# Patient Record
Sex: MALE | Race: BLACK OR AFRICAN AMERICAN | NOT HISPANIC OR LATINO | Employment: OTHER | URBAN - METROPOLITAN AREA
[De-identification: names, ages, dates, MRNs, and addresses within clinical notes are randomized per-mention and may not be internally consistent; named-entity substitution may affect disease eponyms.]

---

## 2017-03-14 ENCOUNTER — HISTORICAL (OUTPATIENT)
Dept: LAB | Facility: HOSPITAL | Age: 62
End: 2017-03-14

## 2017-03-17 ENCOUNTER — HISTORICAL (OUTPATIENT)
Dept: LAB | Facility: HOSPITAL | Age: 62
End: 2017-03-17

## 2017-09-08 ENCOUNTER — HISTORICAL (OUTPATIENT)
Dept: LAB | Facility: HOSPITAL | Age: 62
End: 2017-09-08

## 2017-09-08 LAB
ALBUMIN SERPL-MCNC: 4 GM/DL (ref 3.4–5)
ALP SERPL-CCNC: 74 UNIT/L (ref 46–116)
ALT SERPL-CCNC: 58 UNIT/L (ref 12–78)
AST SERPL-CCNC: 31 UNIT/L (ref 15–37)
BILIRUB SERPL-MCNC: 0.6 MG/DL (ref 0.2–1)
BILIRUBIN DIRECT+TOT PNL SERPL-MCNC: 0.21 MG/DL (ref 0–0.2)
BILIRUBIN DIRECT+TOT PNL SERPL-MCNC: 0.39 MG/DL (ref 0–0.8)
CHOLEST SERPL-MCNC: 125 MG/DL (ref 0–200)
CHOLEST/HDLC SERPL: 3.5 {RATIO} (ref 0–5)
HDLC SERPL-MCNC: 36 MG/DL (ref 40–60)
LDLC SERPL CALC-MCNC: 39 MG/DL (ref 0–129)
PROT SERPL-MCNC: 7.3 GM/DL (ref 6.4–8.2)
TRIGL SERPL-MCNC: 248 MG/DL
VLDLC SERPL CALC-MCNC: 50 MG/DL

## 2017-10-16 ENCOUNTER — HISTORICAL (OUTPATIENT)
Dept: LAB | Facility: HOSPITAL | Age: 62
End: 2017-10-16

## 2017-10-16 LAB
ABS NEUT (OLG): 4.12 X10(3)/MCL (ref 2.1–9.2)
APTT PPP: 31.2 SECOND(S) (ref 24.8–36.9)
BASOPHILS # BLD AUTO: 0 X10(3)/MCL (ref 0–0.2)
BASOPHILS NFR BLD AUTO: 1 %
BUN SERPL-MCNC: 17 MG/DL (ref 7–18)
CALCIUM SERPL-MCNC: 9.1 MG/DL (ref 8.5–10.1)
CHLORIDE SERPL-SCNC: 106 MMOL/L (ref 98–107)
CO2 SERPL-SCNC: 29 MMOL/L (ref 21–32)
CREAT SERPL-MCNC: 1.03 MG/DL (ref 0.7–1.3)
EOSINOPHIL # BLD AUTO: 0.2 X10(3)/MCL (ref 0–0.9)
EOSINOPHIL NFR BLD AUTO: 3 %
ERYTHROCYTE [DISTWIDTH] IN BLOOD BY AUTOMATED COUNT: 13.4 % (ref 11.5–17)
GLUCOSE SERPL-MCNC: 118 MG/DL (ref 74–106)
HCT VFR BLD AUTO: 43.8 % (ref 42–52)
HGB BLD-MCNC: 14.8 GM/DL (ref 14–18)
INR PPP: 1.03 (ref 0–1.27)
LYMPHOCYTES # BLD AUTO: 1 X10(3)/MCL (ref 0.6–4.6)
LYMPHOCYTES NFR BLD AUTO: 18 %
MCH RBC QN AUTO: 30.1 PG (ref 27–31)
MCHC RBC AUTO-ENTMCNC: 33.8 GM/DL (ref 33–36)
MCV RBC AUTO: 89.2 FL (ref 80–94)
MONOCYTES # BLD AUTO: 0.4 X10(3)/MCL (ref 0.1–1.3)
MONOCYTES NFR BLD AUTO: 8 %
NEUTROPHILS # BLD AUTO: 4.12 X10(3)/MCL (ref 1.4–7.9)
NEUTROPHILS NFR BLD AUTO: 71 %
PLATELET # BLD AUTO: 218 X10(3)/MCL (ref 130–400)
PMV BLD AUTO: 9.2 FL (ref 9.4–12.4)
POTASSIUM SERPL-SCNC: 3.8 MMOL/L (ref 3.5–5.1)
PROTHROMBIN TIME: 13.3 SECOND(S) (ref 12.2–14.7)
RBC # BLD AUTO: 4.91 X10(6)/MCL (ref 4.7–6.1)
SODIUM SERPL-SCNC: 142 MMOL/L (ref 136–145)
WBC # SPEC AUTO: 5.8 X10(3)/MCL (ref 4.5–11.5)

## 2017-10-18 ENCOUNTER — HISTORICAL (OUTPATIENT)
Dept: CARDIOLOGY | Facility: HOSPITAL | Age: 62
End: 2017-10-18

## 2018-01-11 ENCOUNTER — HISTORICAL (OUTPATIENT)
Dept: LAB | Facility: HOSPITAL | Age: 63
End: 2018-01-11

## 2018-01-11 LAB
ALBUMIN SERPL-MCNC: 4.2 GM/DL (ref 3.4–5)
ALBUMIN SERPL-MCNC: 4.2 GM/DL (ref 3.4–5)
ALBUMIN/GLOB SERPL: 1.3 RATIO (ref 1.1–2)
ALP SERPL-CCNC: 65 UNIT/L (ref 46–116)
ALP SERPL-CCNC: 67 UNIT/L (ref 50–136)
ALT SERPL-CCNC: 56 UNIT/L (ref 12–78)
ALT SERPL-CCNC: 57 UNIT/L (ref 12–78)
AST SERPL-CCNC: 21 UNIT/L (ref 15–37)
AST SERPL-CCNC: 23 UNIT/L (ref 15–37)
BILIRUB SERPL-MCNC: 0.6 MG/DL (ref 0.2–1)
BILIRUB SERPL-MCNC: 0.6 MG/DL (ref 0.2–1)
BILIRUBIN DIRECT+TOT PNL SERPL-MCNC: 0.21 MG/DL (ref 0–0.2)
BILIRUBIN DIRECT+TOT PNL SERPL-MCNC: 0.23 MG/DL (ref 0–0.5)
BILIRUBIN DIRECT+TOT PNL SERPL-MCNC: 0.37 MG/DL (ref 0–0.8)
BILIRUBIN DIRECT+TOT PNL SERPL-MCNC: 0.44 MG/DL (ref 0–0.8)
BUN SERPL-MCNC: 17 MG/DL (ref 7–18)
BUN SERPL-MCNC: 17.5 MG/DL (ref 7–18)
CALCIUM SERPL-MCNC: 9.5 MG/DL (ref 8.5–10.1)
CALCIUM SERPL-MCNC: 9.5 MG/DL (ref 8.5–10.1)
CHLORIDE SERPL-SCNC: 102 MMOL/L (ref 98–107)
CHLORIDE SERPL-SCNC: 103 MMOL/L (ref 98–107)
CHOLEST SERPL-MCNC: 142 MG/DL (ref 0–200)
CHOLEST/HDLC SERPL: 3.8 {RATIO} (ref 0–5)
CO2 SERPL-SCNC: 28.1 MMOL/L (ref 21–32)
CO2 SERPL-SCNC: 29.3 MMOL/L (ref 21–32)
CREAT SERPL-MCNC: 0.97 MG/DL (ref 0.6–1.3)
CREAT SERPL-MCNC: 0.97 MG/DL (ref 0.6–1.3)
EST. AVERAGE GLUCOSE BLD GHB EST-MCNC: 140 MG/DL
GLOBULIN SER-MCNC: 3.3 GM/DL (ref 2.4–3.5)
GLUCOSE SERPL-MCNC: 148 MG/DL (ref 74–106)
GLUCOSE SERPL-MCNC: 150 MG/DL (ref 74–106)
HBA1C MFR BLD: 6.5 % (ref 4.5–6.2)
HDLC SERPL-MCNC: 37 MG/DL (ref 40–60)
LDLC SERPL CALC-MCNC: 33 MG/DL (ref 0–129)
LIVER PROFILE INTERP: NORMAL
POTASSIUM SERPL-SCNC: 4.2 MMOL/L (ref 3.5–5.1)
POTASSIUM SERPL-SCNC: 4.4 MMOL/L (ref 3.5–5.1)
PROT SERPL-MCNC: 7.4 GM/DL (ref 6.4–8.2)
PROT SERPL-MCNC: 7.5 GM/DL (ref 6.4–8.2)
SODIUM SERPL-SCNC: 137 MMOL/L (ref 136–145)
SODIUM SERPL-SCNC: 138 MMOL/L (ref 136–145)
TRIGL SERPL-MCNC: 358 MG/DL
TSH SERPL-ACNC: 6.59 MIU/ML (ref 0.36–3.74)
VLDLC SERPL CALC-MCNC: 72 MG/DL

## 2018-05-03 ENCOUNTER — HISTORICAL (OUTPATIENT)
Dept: LAB | Facility: HOSPITAL | Age: 63
End: 2018-05-03

## 2018-05-03 LAB
BUN SERPL-MCNC: 18.3 MG/DL (ref 7–18)
CALCIUM SERPL-MCNC: 8.8 MG/DL (ref 8.5–10.1)
CHLORIDE SERPL-SCNC: 103 MMOL/L (ref 98–107)
CO2 SERPL-SCNC: 29.5 MMOL/L (ref 21–32)
CREAT SERPL-MCNC: 1.05 MG/DL (ref 0.6–1.3)
CREAT/UREA NIT SERPL: 17
EST. AVERAGE GLUCOSE BLD GHB EST-MCNC: 137 MG/DL
GLUCOSE SERPL-MCNC: 146 MG/DL (ref 74–106)
HBA1C MFR BLD: 6.4 % (ref 4.5–6.2)
POTASSIUM SERPL-SCNC: 3.7 MMOL/L (ref 3.5–5.1)
PSA SERPL-MCNC: 0.66 NG/ML (ref 0–4)
SODIUM SERPL-SCNC: 144 MMOL/L (ref 136–145)

## 2018-08-20 ENCOUNTER — HISTORICAL (OUTPATIENT)
Dept: LAB | Facility: HOSPITAL | Age: 63
End: 2018-08-20

## 2018-08-20 LAB
ALBUMIN SERPL-MCNC: 4.2 GM/DL (ref 3.4–5)
ALP SERPL-CCNC: 60 UNIT/L (ref 46–116)
ALT SERPL-CCNC: 48 UNIT/L (ref 12–78)
AST SERPL-CCNC: 29 UNIT/L (ref 15–37)
BILIRUB SERPL-MCNC: 0.7 MG/DL (ref 0.2–1)
BILIRUBIN DIRECT+TOT PNL SERPL-MCNC: 0.23 MG/DL (ref 0–0.2)
BILIRUBIN DIRECT+TOT PNL SERPL-MCNC: 0.47 MG/DL (ref 0–0.8)
CHOLEST SERPL-MCNC: 118 MG/DL (ref 0–200)
CHOLEST/HDLC SERPL: 3.1 {RATIO} (ref 0–5)
HDLC SERPL-MCNC: 38 MG/DL (ref 40–60)
LDLC SERPL CALC-MCNC: 40 MG/DL (ref 0–129)
PROT SERPL-MCNC: 7.3 GM/DL (ref 6.4–8.2)
TRIGL SERPL-MCNC: 200 MG/DL
VLDLC SERPL CALC-MCNC: 40 MG/DL

## 2018-09-19 ENCOUNTER — HISTORICAL (OUTPATIENT)
Dept: LAB | Facility: HOSPITAL | Age: 63
End: 2018-09-19

## 2018-09-19 LAB
BUN SERPL-MCNC: 18.8 MG/DL (ref 7–18)
CALCIUM SERPL-MCNC: 9.3 MG/DL (ref 8.5–10.1)
CHLORIDE SERPL-SCNC: 102 MMOL/L (ref 98–107)
CO2 SERPL-SCNC: 28.4 MMOL/L (ref 21–32)
CREAT SERPL-MCNC: 1.17 MG/DL (ref 0.6–1.3)
CREAT/UREA NIT SERPL: 16
GLUCOSE SERPL-MCNC: 90 MG/DL (ref 74–106)
POTASSIUM SERPL-SCNC: 4.5 MMOL/L (ref 3.5–5.1)
SODIUM SERPL-SCNC: 140 MMOL/L (ref 136–145)
T3FREE SERPL-MCNC: 3.1 PG/ML (ref 2.2–4)
T4 FREE SERPL-MCNC: 0.88 NG/DL (ref 0.76–1.46)
TSH SERPL-ACNC: 3.95 MIU/ML (ref 0.36–3.74)

## 2018-10-26 ENCOUNTER — HISTORICAL (OUTPATIENT)
Dept: RADIOLOGY | Facility: HOSPITAL | Age: 63
End: 2018-10-26

## 2018-10-26 LAB
ABS NEUT (OLG): 4.67 X10(3)/MCL (ref 2.1–9.2)
ALBUMIN SERPL-MCNC: 4.1 GM/DL (ref 3.4–5)
ALBUMIN/GLOB SERPL: 1.4 RATIO (ref 1.1–2)
ALP SERPL-CCNC: 65 UNIT/L (ref 46–116)
ALT SERPL-CCNC: 57 UNIT/L (ref 12–78)
AST SERPL-CCNC: 25 UNIT/L (ref 15–37)
BASOPHILS # BLD AUTO: 0 X10(3)/MCL (ref 0–0.2)
BASOPHILS NFR BLD AUTO: 0 %
BILIRUB SERPL-MCNC: 0.4 MG/DL (ref 0.2–1)
BILIRUBIN DIRECT+TOT PNL SERPL-MCNC: 0.18 MG/DL (ref 0–0.2)
BILIRUBIN DIRECT+TOT PNL SERPL-MCNC: 0.22 MG/DL (ref 0–0.8)
BUN SERPL-MCNC: 15.2 MG/DL (ref 7–18)
CALCIUM SERPL-MCNC: 8.8 MG/DL (ref 8.5–10.1)
CHLORIDE SERPL-SCNC: 105 MMOL/L (ref 98–107)
CHOLEST SERPL-MCNC: 100 MG/DL (ref 0–200)
CHOLEST/HDLC SERPL: 4.2 {RATIO} (ref 0–5)
CO2 SERPL-SCNC: 30 MMOL/L (ref 21–32)
CREAT SERPL-MCNC: 1.11 MG/DL (ref 0.6–1.3)
EOSINOPHIL # BLD AUTO: 0.2 X10(3)/MCL (ref 0–0.9)
EOSINOPHIL NFR BLD AUTO: 3 %
ERYTHROCYTE [DISTWIDTH] IN BLOOD BY AUTOMATED COUNT: 13 % (ref 11.5–17)
EST. AVERAGE GLUCOSE BLD GHB EST-MCNC: 148 MG/DL
GLOBULIN SER-MCNC: 3 GM/DL (ref 2.4–3.5)
GLUCOSE SERPL-MCNC: 110 MG/DL (ref 74–106)
HBA1C MFR BLD: 6.8 % (ref 4.5–6.2)
HCT VFR BLD AUTO: 39.3 % (ref 42–52)
HDLC SERPL-MCNC: 24 MG/DL (ref 40–60)
HGB BLD-MCNC: 12.9 GM/DL (ref 14–18)
IMM GRANULOCYTES # BLD AUTO: 0.02 % (ref 0–0.02)
IMM GRANULOCYTES NFR BLD AUTO: 0.3 % (ref 0–0.43)
LDLC SERPL CALC-MCNC: 46 MG/DL (ref 0–129)
LYMPHOCYTES # BLD AUTO: 1 X10(3)/MCL (ref 0.6–4.6)
LYMPHOCYTES NFR BLD AUTO: 16 %
MCH RBC QN AUTO: 29.3 PG (ref 27–31)
MCHC RBC AUTO-ENTMCNC: 32.8 GM/DL (ref 33–36)
MCV RBC AUTO: 89.3 FL (ref 80–94)
MONOCYTES # BLD AUTO: 0.5 X10(3)/MCL (ref 0.1–1.3)
MONOCYTES NFR BLD AUTO: 8 %
NEUTROPHILS # BLD AUTO: 4.67 X10(3)/MCL (ref 1.4–7.9)
NEUTROPHILS NFR BLD AUTO: 73 %
PLATELET # BLD AUTO: 259 X10(3)/MCL (ref 130–400)
PMV BLD AUTO: 8.6 FL (ref 9.4–12.4)
POTASSIUM SERPL-SCNC: 4.8 MMOL/L (ref 3.5–5.1)
PROT SERPL-MCNC: 7.1 GM/DL (ref 6.4–8.2)
RBC # BLD AUTO: 4.4 X10(6)/MCL (ref 4.7–6.1)
SODIUM SERPL-SCNC: 143 MMOL/L (ref 136–145)
TRIGL SERPL-MCNC: 151 MG/DL
TSH SERPL-ACNC: 6.5 MIU/ML (ref 0.36–3.74)
VLDLC SERPL CALC-MCNC: 30 MG/DL
WBC # SPEC AUTO: 6.4 X10(3)/MCL (ref 4.5–11.5)

## 2019-01-31 ENCOUNTER — HISTORICAL (OUTPATIENT)
Dept: LAB | Facility: HOSPITAL | Age: 64
End: 2019-01-31

## 2019-01-31 LAB
ABS NEUT (OLG): 2.83 X10(3)/MCL (ref 2.1–9.2)
ALBUMIN SERPL-MCNC: 4.4 GM/DL (ref 3.4–5)
ALBUMIN/GLOB SERPL: 1.4 RATIO (ref 1.1–2)
ALP SERPL-CCNC: 80 UNIT/L (ref 46–116)
ALT SERPL-CCNC: 42 UNIT/L (ref 12–78)
AST SERPL-CCNC: 20 UNIT/L (ref 15–37)
BASOPHILS # BLD AUTO: 0 X10(3)/MCL (ref 0–0.2)
BASOPHILS NFR BLD AUTO: 0 %
BILIRUB SERPL-MCNC: 0.9 MG/DL (ref 0.2–1)
BILIRUBIN DIRECT+TOT PNL SERPL-MCNC: 0.23 MG/DL (ref 0–0.2)
BILIRUBIN DIRECT+TOT PNL SERPL-MCNC: 0.67 MG/DL (ref 0–0.8)
BUN SERPL-MCNC: 18.8 MG/DL (ref 7–18)
CALCIUM SERPL-MCNC: 9.5 MG/DL (ref 8.5–10.1)
CHLORIDE SERPL-SCNC: 105 MMOL/L (ref 98–107)
CHOLEST SERPL-MCNC: 159 MG/DL (ref 0–200)
CHOLEST/HDLC SERPL: 4.4 {RATIO} (ref 0–5)
CO2 SERPL-SCNC: 29.4 MMOL/L (ref 21–32)
CREAT SERPL-MCNC: 0.97 MG/DL (ref 0.6–1.3)
EOSINOPHIL # BLD AUTO: 0.2 X10(3)/MCL (ref 0–0.9)
EOSINOPHIL NFR BLD AUTO: 5 %
ERYTHROCYTE [DISTWIDTH] IN BLOOD BY AUTOMATED COUNT: 12.9 % (ref 11.5–17)
EST. AVERAGE GLUCOSE BLD GHB EST-MCNC: 134 MG/DL
GLOBULIN SER-MCNC: 3.2 GM/DL (ref 2.4–3.5)
GLUCOSE SERPL-MCNC: 126 MG/DL (ref 74–106)
HBA1C MFR BLD: 6.3 % (ref 4.5–6.2)
HCT VFR BLD AUTO: 47.1 % (ref 42–52)
HDLC SERPL-MCNC: 36 MG/DL (ref 40–60)
HGB BLD-MCNC: 15.3 GM/DL (ref 14–18)
LDLC SERPL CALC-MCNC: 72 MG/DL (ref 0–129)
LYMPHOCYTES # BLD AUTO: 1.2 X10(3)/MCL (ref 0.6–4.6)
LYMPHOCYTES NFR BLD AUTO: 25 %
MCH RBC QN AUTO: 29.3 PG (ref 27–31)
MCHC RBC AUTO-ENTMCNC: 32.5 GM/DL (ref 33–36)
MCV RBC AUTO: 90.1 FL (ref 80–94)
MONOCYTES # BLD AUTO: 0.4 X10(3)/MCL (ref 0.1–1.3)
MONOCYTES NFR BLD AUTO: 8 %
NEUTROPHILS # BLD AUTO: 2.83 X10(3)/MCL (ref 1.4–7.9)
NEUTROPHILS NFR BLD AUTO: 61 %
PLATELET # BLD AUTO: 228 X10(3)/MCL (ref 130–400)
PMV BLD AUTO: 9.1 FL (ref 9.4–12.4)
POTASSIUM SERPL-SCNC: 4 MMOL/L (ref 3.5–5.1)
PROT SERPL-MCNC: 7.6 GM/DL (ref 6.4–8.2)
PSA SERPL-MCNC: 0.55 NG/ML (ref 0–4)
RBC # BLD AUTO: 5.23 X10(6)/MCL (ref 4.7–6.1)
SODIUM SERPL-SCNC: 142 MMOL/L (ref 136–145)
T4 FREE SERPL-MCNC: 0.8 NG/DL (ref 0.76–1.46)
TRIGL SERPL-MCNC: 256 MG/DL
TSH SERPL-ACNC: 3.65 MIU/ML (ref 0.36–3.74)
VLDLC SERPL CALC-MCNC: 51 MG/DL
WBC # SPEC AUTO: 4.6 X10(3)/MCL (ref 4.5–11.5)

## 2019-05-08 ENCOUNTER — HISTORICAL (OUTPATIENT)
Dept: LAB | Facility: HOSPITAL | Age: 64
End: 2019-05-08

## 2019-05-08 LAB
ALBUMIN SERPL-MCNC: 4.2 GM/DL (ref 3.4–5)
ALP SERPL-CCNC: 74 UNIT/L (ref 46–116)
ALT SERPL-CCNC: 52 UNIT/L (ref 12–78)
AST SERPL-CCNC: 30 UNIT/L (ref 15–37)
BILIRUB SERPL-MCNC: 0.6 MG/DL (ref 0.2–1)
BILIRUBIN DIRECT+TOT PNL SERPL-MCNC: 0.25 MG/DL (ref 0–0.2)
BILIRUBIN DIRECT+TOT PNL SERPL-MCNC: 0.35 MG/DL (ref 0–0.8)
CHOLEST SERPL-MCNC: 138 MG/DL (ref 0–200)
CHOLEST/HDLC SERPL: 3.8 {RATIO} (ref 0–5)
HDLC SERPL-MCNC: 36 MG/DL (ref 40–60)
LDLC SERPL CALC-MCNC: 52 MG/DL (ref 0–129)
PROT SERPL-MCNC: 7.6 GM/DL (ref 6.4–8.2)
TRIGL SERPL-MCNC: 250 MG/DL
VLDLC SERPL CALC-MCNC: 50 MG/DL

## 2019-09-03 ENCOUNTER — HISTORICAL (OUTPATIENT)
Dept: LAB | Facility: HOSPITAL | Age: 64
End: 2019-09-03

## 2019-09-03 LAB
ALBUMIN SERPL-MCNC: 4.3 GM/DL (ref 3.4–5)
ALP SERPL-CCNC: 74 UNIT/L (ref 50–136)
ALT SERPL-CCNC: 52 UNIT/L (ref 12–78)
AST SERPL-CCNC: 26 UNIT/L (ref 15–37)
BILIRUB SERPL-MCNC: 0.7 MG/DL (ref 0.2–1)
BILIRUBIN DIRECT+TOT PNL SERPL-MCNC: 0.2 MG/DL (ref 0–0.5)
BILIRUBIN DIRECT+TOT PNL SERPL-MCNC: 0.5 MG/DL (ref 0–0.8)
BUN SERPL-MCNC: 21 MG/DL (ref 7–18)
CALCIUM SERPL-MCNC: 9.2 MG/DL (ref 8.5–10.1)
CHLORIDE SERPL-SCNC: 105 MMOL/L (ref 98–107)
CHOLEST SERPL-MCNC: 108 MG/DL (ref 0–200)
CHOLEST/HDLC SERPL: 3.6 {RATIO} (ref 0–5)
CO2 SERPL-SCNC: 29 MMOL/L (ref 21–32)
CREAT SERPL-MCNC: 1.06 MG/DL (ref 0.7–1.3)
CREAT/UREA NIT SERPL: 19.8
GLUCOSE SERPL-MCNC: 135 MG/DL (ref 74–106)
HDLC SERPL-MCNC: 30 MG/DL (ref 35–60)
LDLC SERPL CALC-MCNC: 22 MG/DL (ref 0–129)
POTASSIUM SERPL-SCNC: 4.5 MMOL/L (ref 3.5–5.1)
PROT SERPL-MCNC: 7.1 GM/DL (ref 6.4–8.2)
SODIUM SERPL-SCNC: 141 MMOL/L (ref 136–145)
T3FREE SERPL-MCNC: 2.93 PG/ML (ref 2.18–3.98)
T4 FREE SERPL-MCNC: 0.89 NG/DL (ref 0.76–1.46)
TRIGL SERPL-MCNC: 278 MG/DL (ref 30–150)
TSH SERPL-ACNC: 4.47 MIU/L (ref 0.36–3.74)
VLDLC SERPL CALC-MCNC: 56 MG/DL

## 2019-11-20 ENCOUNTER — HOSPITAL ENCOUNTER (OUTPATIENT)
Dept: MEDSURG UNIT | Facility: HOSPITAL | Age: 64
End: 2019-11-21
Attending: HOSPITALIST | Admitting: HOSPITALIST

## 2019-11-20 LAB
POC TROPONIN: 0.01 NG/ML (ref 0–0.08)
POC TROPONIN: 0.02 NG/ML (ref 0–0.08)

## 2019-11-21 LAB — POC TROPONIN: 0.01 NG/ML (ref 0–0.08)

## 2020-09-10 ENCOUNTER — HISTORICAL (OUTPATIENT)
Dept: LAB | Facility: HOSPITAL | Age: 65
End: 2020-09-10

## 2020-09-10 LAB
ALBUMIN SERPL-MCNC: 4.6 GM/DL (ref 3.4–5)
ALBUMIN/GLOB SERPL: 1.2 RATIO (ref 1.1–2)
ALP SERPL-CCNC: 86 UNIT/L (ref 46–116)
ALT SERPL-CCNC: 40 UNIT/L (ref 12–78)
AST SERPL-CCNC: 16 UNIT/L (ref 15–37)
BILIRUB SERPL-MCNC: 0.8 MG/DL (ref 0.2–1)
BILIRUBIN DIRECT+TOT PNL SERPL-MCNC: 0.3 MG/DL (ref 0–0.2)
BILIRUBIN DIRECT+TOT PNL SERPL-MCNC: 0.5 MG/DL (ref 0–0.8)
BUN SERPL-MCNC: 22.7 MG/DL (ref 7–18)
CALCIUM SERPL-MCNC: 10.2 MG/DL (ref 8.5–10.1)
CHLORIDE SERPL-SCNC: 98 MMOL/L (ref 98–107)
CO2 SERPL-SCNC: 32.4 MMOL/L (ref 21–32)
CREAT SERPL-MCNC: 1.38 MG/DL (ref 0.6–1.3)
EST. AVERAGE GLUCOSE BLD GHB EST-MCNC: 235 MG/DL
GLOBULIN SER-MCNC: 3.8 GM/DL (ref 2.4–3.5)
GLUCOSE SERPL-MCNC: 429 MG/DL (ref 74–106)
HBA1C MFR BLD: 9.8 % (ref 4.5–6.2)
POTASSIUM SERPL-SCNC: 4.8 MMOL/L (ref 3.5–5.1)
PROT SERPL-MCNC: 8.4 GM/DL (ref 6.4–8.2)
SODIUM SERPL-SCNC: 136 MMOL/L (ref 136–145)

## 2020-12-01 ENCOUNTER — HISTORICAL (OUTPATIENT)
Dept: LAB | Facility: HOSPITAL | Age: 65
End: 2020-12-01

## 2020-12-01 LAB
ALBUMIN SERPL-MCNC: 4.2 GM/DL (ref 3.4–4.8)
ALP SERPL-CCNC: 53 UNIT/L (ref 40–150)
ALT SERPL-CCNC: 35 UNIT/L (ref 0–55)
AST SERPL-CCNC: 26 UNIT/L (ref 5–34)
BILIRUB SERPL-MCNC: 1 MG/DL
BILIRUBIN DIRECT+TOT PNL SERPL-MCNC: 0.4 MG/DL (ref 0–0.5)
BILIRUBIN DIRECT+TOT PNL SERPL-MCNC: 0.6 MG/DL (ref 0–0.8)
CHOLEST SERPL-MCNC: 118 MG/DL
CHOLEST/HDLC SERPL: 3 {RATIO} (ref 0–5)
HDLC SERPL-MCNC: 43 MG/DL (ref 35–60)
LDLC SERPL CALC-MCNC: 48 MG/DL (ref 50–140)
PROT SERPL-MCNC: 7.3 GM/DL (ref 5.8–7.6)
TRIGL SERPL-MCNC: 136 MG/DL (ref 34–140)
VLDLC SERPL CALC-MCNC: 27 MG/DL

## 2021-06-22 ENCOUNTER — HISTORICAL (OUTPATIENT)
Dept: LAB | Facility: HOSPITAL | Age: 66
End: 2021-06-22

## 2021-06-22 LAB
ALBUMIN SERPL-MCNC: 4.2 GM/DL (ref 3.4–4.8)
ALP SERPL-CCNC: 60 UNIT/L (ref 40–150)
ALT SERPL-CCNC: 43 UNIT/L (ref 0–55)
AST SERPL-CCNC: 26 UNIT/L (ref 5–34)
BILIRUB SERPL-MCNC: 0.9 MG/DL
BILIRUBIN DIRECT+TOT PNL SERPL-MCNC: 0.4 MG/DL (ref 0–0.5)
BILIRUBIN DIRECT+TOT PNL SERPL-MCNC: 0.5 MG/DL (ref 0–0.8)
CHOLEST SERPL-MCNC: 107 MG/DL
CHOLEST/HDLC SERPL: 3 {RATIO} (ref 0–5)
HDLC SERPL-MCNC: 34 MG/DL (ref 35–60)
LDLC SERPL CALC-MCNC: 20 MG/DL (ref 50–140)
PROT SERPL-MCNC: 7 GM/DL (ref 5.8–7.6)
TRIGL SERPL-MCNC: 266 MG/DL (ref 34–140)
VLDLC SERPL CALC-MCNC: 53 MG/DL

## 2021-07-19 ENCOUNTER — HISTORICAL (OUTPATIENT)
Dept: LAB | Facility: HOSPITAL | Age: 66
End: 2021-07-19

## 2021-07-19 LAB
HAV IGM SERPL QL IA: NONREACTIVE
HBV CORE IGM SERPL QL IA: NONREACTIVE
HBV SURFACE AG SERPL QL IA: NONREACTIVE
HCV AB SERPL QL IA: REACTIVE
HEPATITIS PANEL INTERP: ABNORMAL

## 2021-08-24 ENCOUNTER — HISTORICAL (OUTPATIENT)
Dept: ADMINISTRATIVE | Facility: HOSPITAL | Age: 66
End: 2021-08-24

## 2021-08-24 LAB
ABS NEUT (OLG): 3.74 X10(3)/MCL (ref 2.1–9.2)
ALBUMIN SERPL-MCNC: 4.6 GM/DL (ref 3.4–4.8)
ALBUMIN/GLOB SERPL: 1.5 RATIO (ref 1.1–2)
ALP SERPL-CCNC: 50 UNIT/L (ref 40–150)
ALT SERPL-CCNC: 28 UNIT/L (ref 0–55)
AST SERPL-CCNC: 20 UNIT/L (ref 5–34)
BASOPHILS # BLD AUTO: 0 X10(3)/MCL (ref 0–0.2)
BASOPHILS NFR BLD AUTO: 1 %
BILIRUB SERPL-MCNC: 0.7 MG/DL
BILIRUBIN DIRECT+TOT PNL SERPL-MCNC: 0.3 MG/DL (ref 0–0.5)
BILIRUBIN DIRECT+TOT PNL SERPL-MCNC: 0.4 MG/DL (ref 0–0.8)
BUN SERPL-MCNC: 14.9 MG/DL (ref 8.4–25.7)
CALCIUM SERPL-MCNC: 10.4 MG/DL (ref 8.8–10)
CHLORIDE SERPL-SCNC: 109 MMOL/L (ref 98–107)
CO2 SERPL-SCNC: 21 MMOL/L (ref 23–31)
CREAT SERPL-MCNC: 0.81 MG/DL (ref 0.73–1.18)
EOSINOPHIL # BLD AUTO: 0.3 X10(3)/MCL (ref 0–0.9)
EOSINOPHIL NFR BLD AUTO: 6 %
ERYTHROCYTE [DISTWIDTH] IN BLOOD BY AUTOMATED COUNT: 13.2 % (ref 11.5–14.5)
FERRITIN SERPL-MCNC: 610.79 NG/ML (ref 21.81–274.66)
GLOBULIN SER-MCNC: 3.1 GM/DL (ref 2.4–3.5)
GLUCOSE SERPL-MCNC: 106 MG/DL (ref 82–115)
HAV AB SER QL IA: NONREACTIVE
HBV CORE AB SERPL QL IA: NONREACTIVE
HBV SURFACE AB SER-ACNC: 0.59 MIU/ML
HBV SURFACE AB SERPL IA-ACNC: NONREACTIVE M[IU]/ML
HBV SURFACE AG SERPL QL IA: NONREACTIVE
HCT VFR BLD AUTO: 38.5 % (ref 40–51)
HGB BLD-MCNC: 12.8 GM/DL (ref 13.5–17.5)
HIV 1+2 AB+HIV1 P24 AG SERPL QL IA: NONREACTIVE
IMM GRANULOCYTES # BLD AUTO: 0.01 10*3/UL
IMM GRANULOCYTES NFR BLD AUTO: 0 %
INR PPP: 1.06 (ref 0.9–1.2)
LYMPHOCYTES # BLD AUTO: 1.1 X10(3)/MCL (ref 0.6–4.6)
LYMPHOCYTES NFR BLD AUTO: 19 %
MCH RBC QN AUTO: 31 PG (ref 26–34)
MCHC RBC AUTO-ENTMCNC: 33.2 GM/DL (ref 31–37)
MCV RBC AUTO: 93.2 FL (ref 80–100)
MONOCYTES # BLD AUTO: 0.4 X10(3)/MCL (ref 0.1–1.3)
MONOCYTES NFR BLD AUTO: 7 %
NEUTROPHILS # BLD AUTO: 3.74 X10(3)/MCL (ref 2.1–9.2)
NEUTROPHILS NFR BLD AUTO: 67 %
NRBC BLD AUTO-RTO: 0 % (ref 0–0.2)
PLATELET # BLD AUTO: 254 X10(3)/MCL (ref 130–400)
PMV BLD AUTO: 9.5 FL (ref 7.4–10.4)
POTASSIUM SERPL-SCNC: 4.6 MMOL/L (ref 3.5–5.1)
PROT SERPL-MCNC: 7.7 GM/DL (ref 5.8–7.6)
PROTHROMBIN TIME: 13.6 SECOND(S) (ref 11.9–14.4)
RBC # BLD AUTO: 4.13 X10(6)/MCL (ref 4.5–5.9)
SODIUM SERPL-SCNC: 140 MMOL/L (ref 136–145)
T PALLIDUM AB SER QL: NONREACTIVE
WBC # SPEC AUTO: 5.6 X10(3)/MCL (ref 4.5–11)

## 2021-09-01 ENCOUNTER — HISTORICAL (OUTPATIENT)
Dept: LAB | Facility: HOSPITAL | Age: 66
End: 2021-09-01

## 2021-09-01 LAB — SARS-COV-2 RNA RESP QL NAA+PROBE: POSITIVE

## 2022-03-07 ENCOUNTER — HISTORICAL (OUTPATIENT)
Dept: LAB | Facility: HOSPITAL | Age: 67
End: 2022-03-07

## 2022-03-07 LAB
ALBUMIN SERPL-MCNC: 4.2 G/DL (ref 3.4–4.8)
ALP SERPL-CCNC: 52 U/L (ref 40–150)
ALT SERPL-CCNC: 23 U/L (ref 0–55)
AST SERPL-CCNC: 17 U/L (ref 5–34)
BILIRUB SERPL-MCNC: 0.6 MG/DL
BILIRUBIN DIRECT+TOT PNL SERPL-MCNC: 0.3 (ref 0–0.5)
BILIRUBIN DIRECT+TOT PNL SERPL-MCNC: 0.3 (ref 0–0.8)
CHOLEST SERPL-MCNC: 131 MG/DL
CHOLEST/HDLC SERPL: 4 {RATIO} (ref 0–5)
HDLC SERPL-MCNC: 36 MG/DL (ref 35–60)
HEMOLYSIS INTERF INDEX SERPL-ACNC: 7
ICTERIC INTERF INDEX SERPL-ACNC: 0
LDLC SERPL CALC-MCNC: 59 MG/DL (ref 50–140)
LIPEMIC INTERF INDEX SERPL-ACNC: 6
PROT SERPL-MCNC: 7.2 G/DL (ref 5.8–7.6)
TRIGL SERPL-MCNC: 182 MG/DL (ref 34–140)
VLDLC SERPL CALC-MCNC: 36 MG/DL

## 2022-04-29 ENCOUNTER — HOSPITAL ENCOUNTER (OUTPATIENT)
Dept: MEDSURG UNIT | Facility: HOSPITAL | Age: 67
End: 2022-04-29
Attending: INTERNAL MEDICINE | Admitting: INTERNAL MEDICINE

## 2022-04-29 LAB
BUN SERPL-MCNC: 16.8 MG/DL (ref 8.4–25.7)
CALCIUM SERPL-MCNC: 10 MG/DL (ref 8.7–10.5)
CBG: 88 (ref 70–115)
CHLORIDE SERPL-SCNC: 101 MMOL/L (ref 98–107)
CO2 SERPL-SCNC: 28 MMOL/L (ref 23–31)
CREAT SERPL-MCNC: 0.79 MG/DL (ref 0.73–1.18)
CREAT/UREA NIT SERPL: 21
GLUCOSE SERPL-MCNC: 91 MG/DL (ref 82–115)
HEMOLYSIS INTERF INDEX SERPL-ACNC: 2
HEMOLYSIS INTERF INDEX SERPL-ACNC: 2
ICTERIC INTERF INDEX SERPL-ACNC: 1
LIPEMIC INTERF INDEX SERPL-ACNC: 8
MAGNESIUM SERPL-MCNC: 1.6 MG/DL (ref 1.6–2.6)
POTASSIUM SERPL-SCNC: 3.3 MMOL/L (ref 3.5–5.1)
SODIUM SERPL-SCNC: 141 MMOL/L (ref 136–145)

## 2022-04-30 NOTE — H&P
"   Patient:   Minesh Dumont            MRN: [id number]            FIN: [id number]               Age:   64 years     Sex:  Male     :  1955   Associated Diagnoses:   None   Author:   Nurys Burt MD      Chief Complaint   2019 6:23 CST      Pt with complaints of feeling dizzy and just not 'feeling well". Also complaints of slight shortness of breath         HPI: This is a 64-year-old obese -American male who has significant history of ischemic heart disease, patient with LVEF 40%, CABG, PCI, aortic sclerosis, diabetic and CKD stage III presented to the hospital with 2 to 3 days of worsening short of breath with exertion and sensation of not feeling well, his blood pressure was elevated on arrival with systolic in the 200s, patient received Lasix in the emergency room with improvement of symptoms, the time of this evaluation he is not on any distress, no using any oxygen, BN P was elevated 1085, chest x-ray with increased interstitial markings consistent with either pulmonary edema versus pneumonitis, he denies any cough or other respiratory symptoms, patient has been treated for a tooth infection with Augmentin.  Context: CAD, HTN, patient also with a sleep apnea habitus never had a sleep study is done.  Modifying factors: Improved with Lasix.  Accompanying signs and symptoms: Complaint toothache, denies fever, mild nonproductive cough, no chills, no flulike symptoms.    ROS: All system reviewed found to be negative except what is mentioned above.      Health Status   Allergies:    Allergic Reactions (All)  Severity Not Documented  ACE inhibitors- Dry cough.  Canceled/Inactive Reactions (All)  No Known Allergies,    Allergies (1) Active Reaction  ACE inhibitors Dry cough     Current medications:  (Selected)   Inpatient Medications  Ordered  Lasix: 40 mg, form: Injection, IV Push, Once, first dose 19 16:00:00 CST, stop date 19 16:00:00 CST  Pantoprazole 40 mg ORAL " EC-Tablet: 40 mg, form: Tab-EC, Oral, Daily, first dose 19 6:00:00 CST  Plavix 75 mg oral tablet: 75 mg, form: Tab, Oral, Daily, first dose 19 9:00:00 CST  Ranexa: 500 mg, form: Tab-ER, Oral, BID, first dose 19 21:00:00 CST  Xarelto 2.5mg tab: 1 dose, form: Dose, Oral, BID, first dose 19 21:00:00 CST  allopurinol: 300 mg, form: Tab, Oral, Daily, first dose 19 9:00:00 CST  aspirin 81 mg oral tablet, CHEWABLE: 81 mg, form: Tab-Chew, Oral, Daily, first dose 19 9:00:00 CST  atorvastatin: 80 mg, form: Tab, Oral, Daily, first dose 19 17:00:00 CST  carvedilol 12.5 mg oral tablet: 25 mg, form: Tab, Oral, BID, first dose 19 21:00:00 CST  isosorbide MONOnitrate 60 mg oral tablet, Extended Release: 120 mg, form: Tab-ER, Oral, Daily, first dose 19 12:33:00 CST  losartan: 100 mg, form: Tab, Oral, Daily, first dose 19 9:00:00 CST  losartan: 50 mg, form: Tab, Oral, Once, first dose 19 13:00:00 CST, stop date 19 13:00:00 CST  Prescriptions  Prescribed  Zofran ODT 8 mg oral tablet, disintegratin mg = 1 tab(s), Oral, q8hr, # 10 tab(s), 0 Refill(s), Pharmacy: [facility]  amoxicillin/clarithromycin/omeprazole 500 mg-500 mg-20 mg oral kit: See Instructions, Take as directed., # 1 packet(s), 0 Refill(s), Pharmacy: [facility]  baclofen 10 mg oral tablet: 10 mg = 1 tab(s), Oral, TID, PRN PRN as needed for muscle spasm, # 21 tab(s), 0 Refill(s), Pharmacy: [facility]  tiZANidine 2 mg oral tablet: 2 mg = 1 tab(s), Oral, q8hr, PRN PRN as needed for muscle spasm, # 30 tab(s), 0 Refill(s), Pharmacy: [facility]  Documented Medications  Documented  1280 mg Omega-3 oral capsule: 1280 mg Omega-3 oral capsule, 1 cap(s), Oral, BID  AMOXICILLIN  CAP 500M mg = 1 cap(s), Oral, BID  CLARITHROMYC TAB 500M mg = 1 tab(s), Oral, BID  LATANOPROST  SOL 0.005%:   LOSARTAN POT TAB 100M mg = 1 tab(s), Oral,  Daily  METFORMIN    TAB 500MG ER: 500 mg = 1 tab(s), Oral, BID  OMEPRAZOLE   CAP 20M mg = 1 cap(s), Oral, BID  Pantoprazole 40 mg ORAL EC-Tablet: 40 mg = 1 tab(s), Oral, Daily  Plavix 75 mg oral tablet: 75 mg = 1 tab(s), Oral, Daily  Ranexa 500 mg oral tablet, extended release: 500 mg = 1 tab(s), Oral, BID  Vascepa 1 g oral capsule: 2 gm = 2 cap(s), Oral, BID  Xarelto 2.5 mg oral tablet: 2.5 mg = 1 tab(s), Oral, BID  acetaminophen-codeine 300 mg-30 mg oral tablet.: 1 tab(s), Oral, q4-6hr  allopurinol 300 mg oral tablet: 300 mg = 1 tab(s), Oral, Daily, 0 Refill(s)  amoxicillin 500 mg oral tablet: 500 mg = 1 tab(s), Oral, QID  aspirin 81 mg oral tablet, CHEWABLE: 81 mg = 1 tab(s), Oral, Daily, 0 Refill(s)  atorvastatin 80 mg oral tablet: 80 mg = 1 tab(s), Oral, Daily, 0 Refill(s)  carvedilol 12.5 mg oral tablet: 12.5 mg = 1 tab(s), Oral, BID  carvedilol 25 mg oral tablet: 25 mg = 1 tab(s), Oral, BID  ergocalciferol 50,000 intl units (1.25 mg) oral capsule: 22733 IntUnit = 1 cap(s), Oral  isosorbide MONOnitrate 60 mg oral tablet, Extended Release: 120 mg = 2 tab(s), Oral, Daily  losartan 50 mg oral tablet: 50 mg = 1 tab(s), Oral, Daily  metformin 500 mg oral tablet: 500 mg = 1 tab(s), Oral, Daily, with meals, # 30 tab(s), 0 Refill(s)  nitroglycerin 0.4 mg sublingual TAB: 0.4 mg = 1 tab(s), SL, q5min, PRN PRN for chest pain, x3, 0 Refill(s),    Medications (12) Active  Scheduled: (12)  allopurinol 300 mg Tab UD  300 mg 1 tab(s), Oral, Daily  aspirin 81 mg Chew tab  81 mg 1 tab(s), Oral, Daily  atorvastatin 40 mg Tab  80 mg 2 tab(s), Oral, Daily  carvedilol 12.5 mg Tab UD  25 mg 2 tab(s), Oral, BID  clopidogrel 75 mg Tab UD  75 mg 1 tab(s), Oral, Daily  furosemide 10 mg/ml Inj - 4mL  40 mg 4 mL, IV Push, Once  isosorbide mononitrate 60 mg CR  120 mg 2 tab(s), Oral, Daily  losartan 25 mg Tab UD  100 mg 4 tab(s), Oral, Daily  losartan 25 mg Tab UD  50 mg 2 tab(s), Oral, Once  pantoprazole 40 mg EC Tab  40 mg 1 tab(s),  Oral, Daily  ranolazine 500 mg oral ER Tab  500 mg 1 tab(s), Oral, BID  Xarelto 2.5mg tab  1 dose, Oral, BID  Continuous: (0)  PRN: (0)     Problem list:    All Problems  CABG x 5 - Coronary artery bypass grafts x 5 / SNOMED CT 312205531 / Confirmed  CAD - Coronary artery disease / SNOMED CT 2110157007 / Confirmed  New onset of congestive heart failure / SNOMED CT 78402177 / Confirmed  Diabetes mellitus / SNOMED CT 816733164 / Confirmed  GERD - Gastro-esophageal reflux disease / SNOMED CT 4986791709 / Confirmed  Gout / SNOMED CT 939151725 / Confirmed  Hypercholesteremia / SNOMED CT [id number] / Confirmed  Hypertension / SNOMED CT 12537270 / Confirmed  myocardial infarction / SNOMED CT 66993310 / Confirmed  april2013  Peripheral vascular disease / SNOMED CT 5795018556 / Confirmed  bilateral  Stented coronary artery / SNOMED CT 8781119829 / Confirmed,    Active Problems (11)  CABG x 5 - Coronary artery bypass grafts x 5   CAD - Coronary artery disease   Diabetes mellitus   GERD - Gastro-esophageal reflux disease   Gout   Hypercholesteremia   Hypertension   myocardial infarction   New onset of congestive heart failure   Peripheral vascular disease   Stented coronary artery         Histories   Past Medical History:    Active  myocardial infarction (08559074)  Comments:  8/19/2013 CDT 12:03 Daysi Tyler RN  april2013  Hypertension (55876261)  Hypercholesteremia ([id number])  GERD - Gastro-esophageal reflux disease (6970580628)  Peripheral vascular disease (6390113009)  Comments:  8/19/2013 CDT 12:07 Daysi Tyler RN  bilateral  Gout (489926258)  Resolved  CAD - Coronary artery disease (3194897696):  Resolved.  Cardiomyopathy (128560295):  Resolved.  DM - Diabetes mellitus (559434257):  Resolved.   Family History:    No family history items have been selected or recorded.   Procedure history:    colonoscopy.  Cholecystectomy (07872541).  Hernia  repair (09051243).  PTCA - Percutaneous transluminal coronary angioplasty (9369078156).  CABG - Coronary artery bypass graft (443236102).   Social History        Social & Psychosocial Habits    Alcohol  10/18/2017  Use: Current    Type: Beer, Liquor    Frequency: 1-2 times per week    11/20/2019  Use: Current    Frequency: 1-2 times per week    Substance Use  08/19/2013 Risk Assessment: Denies Substance Abuse    Tobacco  08/19/2013 Risk Assessment: Denies Tobacco Use    10/18/2017  Use: Former smoker    Type: Cigarettes    Tobacco use per day: 2    Started at age: 16.0 Years    Comment: quit about 30 years ago - 10/18/2017 07:34 - Anthony Andrea RN    11/20/2019  Use: Former smoker, quit more    Patient Wants Consult For Cessation Counseling N/A    11/20/2019  Use: Former smoker, quit more    Patient Wants Consult For Cessation Counseling No    Abuse/Neglect  11/20/2019  SHX Any signs of abuse or neglect No    11/20/2019  SHX Any signs of abuse or neglect No    Feels unsafe at home: No    Safe place to go: Yes  .        Physical Examination   General:  Alert and oriented, No acute distress.    Eye:  Oropharynx : crowded..    HENT:  Normocephalic, Oral mucosa is moist.    Neck:  Supple, Non-tender, No carotid bruit.    Respiratory:  Lungs are clear to auscultation, Respirations are non-labored, Breath sounds are equal.    Cardiovascular:  Regular rhythm, No murmur, Good pulses equal in all extremities, Normal peripheral perfusion.    Gastrointestinal:  Soft, Non-tender, Non-distended.    Vital Signs   11/20/2019 11:52 CST     Temperature Oral          36.9 DegC                             Temperature Oral (calculated)             98.42 DegF                             Peripheral Pulse Rate     67 bpm                             SpO2                      97 %                             Systolic Blood Pressure   154 mmHg  HI                             Diastolic Blood Pressure  86 mmHg                             Mean  Arterial Pressure, Cuff              109 mmHg    11/20/2019 10:00 CST     Oxygen Therapy            Room air    11/20/2019 9:21 CST      Peripheral Pulse Rate     71 bpm                             Respiratory Rate          20 br/min                             SpO2                      97 %                             Oxygen Therapy            Room air                             Systolic Blood Pressure   182 mmHg  HI                             Diastolic Blood Pressure  97 mmHg  HI    11/20/2019 9:00 CST      Temperature Oral          36.2 DegC                             Temperature Oral (calculated)             97.16 DegF                             Peripheral Pulse Rate     71 bpm                             SpO2                      97 %                             Systolic Blood Pressure   164 mmHg  HI                             Diastolic Blood Pressure  91 mmHg  HI    11/20/2019 8:38 CST      Peripheral Pulse Rate     83 bpm                             Respiratory Rate          20 br/min                             SpO2                      98 %                             Oxygen Therapy            Room air                             Systolic Blood Pressure   185 mmHg  HI                             Diastolic Blood Pressure  121 mmHg  HI    11/20/2019 8:15 CST      Peripheral Pulse Rate     71 bpm                             SpO2                      97 %                             Oxygen Therapy            Room air                             Systolic Blood Pressure   178 mmHg  HI                             Diastolic Blood Pressure  105 mmHg  HI    11/20/2019 7:59 CST      Systolic Blood Pressure   187 mmHg  HI                             Diastolic Blood Pressure  111 mmHg  HI    11/20/2019 7:03 CST      Peripheral Pulse Rate     76 bpm                             Systolic Blood Pressure   184 mmHg  HI                             Diastolic Blood Pressure  115 mmHg  HI    11/20/2019 6:38 CST       Peripheral Pulse Rate     72 bpm                             SpO2                      95 %                             Oxygen Therapy            Room air                             Systolic Blood Pressure   193 mmHg  HI                             Diastolic Blood Pressure  105 mmHg  HI    11/20/2019 6:25 CST      Oxygen Therapy            Room air    11/20/2019 6:23 CST      Peripheral Pulse Rate     78 bpm                             Respiratory Rate          20 br/min                             SpO2                      95 %                             Oxygen Therapy            Room air                             Systolic Blood Pressure   205 mmHg  HI                             Diastolic Blood Pressure  126 mmHg  HI        Vital Signs (last 24 hrs)_____  Last Charted___________  Temp Oral     36.9 DegC  (NOV 20 11:52)  Heart Rate Peripheral   67 bpm  (NOV 20 11:52)  SBP      H 154mmHg  (NOV 20 11:52)  DBP      86 mmHg  (NOV 20 11:52)  SpO2      97 %  (NOV 20 11:52)  Weight      90.4 kg  (NOV 20 12:52)  Height      170.18 cm  (NOV 20 12:52)  BMI      31.21  (NOV 20 12:52)     Measurements from flowsheet : Measurements   11/20/2019 12:52 CST     Weight Measured           90.4 kg                             Height/Length Measured    170.18 cm                             Body Mass Index Measured  31.21 kg/m2    11/20/2019 9:59 CST      Weight Dosing             90.4 kg                             Weight Measured           90.4 kg                             Weight Measured and Calculated in Lbs     199.30 lb                             Height/Length Dosing      170.18 cm                             Height/Length Measured    170.18 cm    11/20/2019 6:23 CST      Weight Dosing             87 kg                             Weight Measured and Calculated in Lbs     191.80 lb                             Weight Estimated          87 kg     Genitourinary:  No costovertebral angle tenderness, No scrotal tenderness.     Musculoskeletal:  Normal range of motion, Normal strength, No tenderness.    Neurologic:  Alert, Oriented, Normal sensory, Normal motor function.    Cognition and Speech:  Oriented, Speech clear and coherent.    Psychiatric:  Cooperative, Appropriate mood & affect.       Health Maintenance      Health Maintenance     Pending (in the next year)        OverDue           Diabetes Maintenance-Urine Dipstick due  07/09/14  and every 1  year(s)           Alcohol Misuse Screening due  01/01/19  and every 1  year(s)           Functional Assessment due  01/01/19  and every 1  year(s)           Coronary Artery Disease Maintenance-Electrocardiogram due  10/20/19  and every 1  year(s)        Due            ADL Screening due  11/20/19  and every 1  year(s)           HF-Ejection Fraction Past 13 Months if Hospitalized due  11/20/19  and every 1  year(s)           HF-Fluid Restriction/Low Sodium Diet Education due  11/20/19  Variable frequency           Hypertension Management-Education due  11/20/19  and every 1  year(s)           Tetanus Vaccine due  11/20/19  and every 10  year(s)           Zoster Vaccine due  11/20/19  and every 100  year(s)        Due In Future            Obesity Screening not due until  01/01/20  and every 1  year(s)     Satisfied (in the past 1 year)        Satisfied            Aspirin Therapy for CVD Prevention on  11/21/19.  Satisfied by Timothy Boo MD           Blood Pressure Screening on  11/20/19.  Satisfied by Linda Linares C.N.A.           Body Mass Index Check on  11/20/19.  Satisfied by LIANE Cortes RD, Adrienne M.           Coronary Artery Disease Maintenance-Antiplatelet Agent Prescribed on  11/21/19.  Satisfied by Timothy Boo MD           Coronary Artery Disease Maintenance-Lipid Lowering Therapy on  11/20/19.  Satisfied by Timothy Boo MD           Coronary Artery Disease Maintenance-BMP on  11/20/19.  Satisfied by Eunice Beasley  Maintenance-Fasting Lipid Profile on  09/03/19.  Satisfied by Drake Christianson           Diabetes Maintenance-HgbA1c on  01/31/19.  Satisfied by Darren Cyr           Diabetes Maintenance-Serum Creatinine on  11/20/19.  Satisfied by Eunice Beasley           Diabetes Screening on  11/20/19.  Satisfied by Eunice Beasley           HF-ACEI/ARB Prescribed if Clinically Indicated on  11/20/19.  Satisfied by Aneta Newby           HF-Beta Blocker Prescribed if Clinically Indicated on  11/20/19.  Satisfied by Timothy Boo MD           Hypertension Management-BMP on  11/20/19.  Satisfied by Eunice Beasley           Hypertension Management-Blood Pressure on  11/20/19.  Satisfied by Linda Linares C.N.A.           Influenza Vaccine on  11/20/19.  Satisfied by Lester ALMANZAR, Noelle SCHREIBER           Lipid Screening on  09/03/19.  Satisfied by Drake Christianson           Obesity Screening on  11/20/19.  Satisfied by Sophia ALDRIDGE, MLN, Mary GIBSON          Review / Management   Results review:     No qualifying data available.    Chest x-ray results   Posterior/anterior, Anterior/posterior, Radiology Report  EXAMINATION  XR Chest 2 Views     INDICATION  Cough     Comparison: 10/26/2018     FINDINGS  The cardiomediastinal silhouette is normal in size. There are  prominent bilateral perihilar opacities and increased interstitial  markings. There is no pleural effusion or pneumothorax. Scarring is  noted in the lung apices. There is no acute bony abnormality.     IMPRESSION  Prominent bilateral perihilar opacities and increased interstitial  markings could represent edema or infection.   ECG interpretation:  Normal sinus rhythm, Non specific St-T wave changes infero-lateral leads..    Condition:  Stable.      Assessment and plan:  ADH F  HFrEF  ICMOP  on  xarelto.  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

## 2022-04-30 NOTE — CONSULTS
Patient:   Minesh Dumont            MRN: [id number]            FIN: [id number]               Age:   64 years     Sex:  Male     :  1955   Associated Diagnoses:   None   Author:   Aneta Newby      Basic Information   Source of history:  Medical record.    Present at bedside:  Medical personnel.    Referral source:  Emergency department.    History limitation:  None.    Resusitation Status:  Full Code.       Chief Complaint   SOB       History of Present Illness   Mr. Dumont is a 63 y/o male, who is known to Dr. Green, with a PMH of CAD s/p CABG, HOWIE, GERD, ICMO EF:40% (2018). He presented to [facility] ER on 19 with c/o SOB. He stated his shortness of breath started on Monday (19), which she related to nasal congestion and/or GERD.  He stated he felt a feeling of fullness.  He had increased shortness of breath which is the reason why he presented to the ER today.   Upon arrival /126, BNP 1085, troponin negative.  CXR revealed prominent bilateral perihilar increased interstitial markings could represent edema or infection.  [facility] is consulted for acute CHF.    PMH: CAD, HOWIE, HLD, HTN, ischemic cardiomyopathy, GERD  PSH: CABG x4 () LIMA to LAD, SVG to second OM, SVG to first OM, SVG to RCA  Family Hx: FatherMI; motherDM; brotherCVA  Social Hx: Former smoker    Previous testing  Carotid US (5.)  Right ICA 60-79% stenosis  Left ICA 1-39% stenosis    Echo (.)   LVEF 40%  Mild calcification of the aortic valve is noted with adequate cuspal excursion  Mild MR    LHC (10.18.)  Severe native coronary artery disease; native left circumflex/OM  fills antegrade via bridging collaterals and retrograde via R >L laterals  2/4 bypass grafts patent  SVG to RCA patent  SVG to OM1 flush occluded  SVG to OM to flush occluded  LIMA to LAD patent      Review of Systems   Constitutional:  No weakness, No fatigue.    Eye:  No icterus.    Ear/Nose/Mouth/Throat:  Tooth pain.     Respiratory:  Shortness of breath, Cough, No sputum production.    Cardiovascular:  No chest pain.    Gastrointestinal:  No nausea, No vomiting.    Endocrine:  No polyuria.    Musculoskeletal:  No neck pain.    Neurologic:  Alert and oriented X4.    All other systems are negative      Health Status   Allergies:    Allergies (1) Active Reaction  ACE inhibitors Dry cough     Current medications:  (Selected)   Inpatient Medications  Ordered  Lasix: 20 mg, form: Injection, IV Push, Daily, first dose 19 9:00:00 CST  Pantoprazole 40 mg ORAL EC-Tablet: 40 mg, form: Tab-EC, Oral, Daily, first dose 19 6:00:00 CST  Plavix 75 mg oral tablet: 75 mg, form: Tab, Oral, Daily, first dose 19 9:00:00 CST  Ranexa: 500 mg, form: Tab-ER, Oral, BID, first dose 19 21:00:00 CST  Xarelto 2.5mg tab: 1 dose, form: Dose, Oral, BID, first dose 19 21:00:00 CST  allopurinol: 300 mg, form: Tab, Oral, Daily, first dose 19 9:00:00 CST  aspirin 81 mg oral tablet, CHEWABLE: 81 mg, form: Tab-Chew, Oral, Daily, first dose 19 9:00:00 CST  atorvastatin: 80 mg, form: Tab, Oral, Daily, first dose 19 17:00:00 CST  carvedilol 12.5 mg oral tablet: 25 mg, form: Tab, Oral, BID, first dose 19 21:00:00 CST  losartan: 50 mg, form: Tab, Oral, Daily, first dose 19 9:00:00 CST  Prescriptions  Prescribed  Zofran ODT 8 mg oral tablet, disintegratin mg = 1 tab(s), Oral, q8hr, # 10 tab(s), 0 Refill(s), Pharmacy: [facility]  amoxicillin/clarithromycin/omeprazole 500 mg-500 mg-20 mg oral kit: See Instructions, Take as directed., # 1 packet(s), 0 Refill(s), Pharmacy: [facility]  baclofen 10 mg oral tablet: 10 mg = 1 tab(s), Oral, TID, PRN PRN as needed for muscle spasm, # 21 tab(s), 0 Refill(s), Pharmacy: [facility]  tiZANidine 2 mg oral tablet: 2 mg = 1 tab(s), Oral, q8hr, PRN PRN as needed for muscle spasm, # 30 tab(s), 0 Refill(s), [facility]  Documented Medications  Documented  1280 mg Omega-3 oral capsule: 1280 mg Omega-3 oral capsule, 1 cap(s), Oral, BID  AMOXICILLIN  CAP 500M mg = 1 cap(s), Oral, BID  CLARITHROMYC TAB 500M mg = 1 tab(s), Oral, BID  LATANOPROST  SOL 0.005%:   LOSARTAN POT TAB 100M mg = 1 tab(s), Oral, Daily  METFORMIN    TAB 500MG ER: 500 mg = 1 tab(s), Oral, BID  OMEPRAZOLE   CAP 20M mg = 1 cap(s), Oral, BID  Pantoprazole 40 mg ORAL EC-Tablet: 40 mg = 1 tab(s), Oral, Daily  Plavix 75 mg oral tablet: 75 mg = 1 tab(s), Oral, Daily  Ranexa 500 mg oral tablet, extended release: 500 mg = 1 tab(s), Oral, BID  Vascepa 1 g oral capsule: 2 gm = 2 cap(s), Oral, BID  Xarelto 2.5 mg oral tablet: 2.5 mg = 1 tab(s), Oral, BID  acetaminophen-codeine 300 mg-30 mg oral tablet.: 1 tab(s), Oral, q4-6hr  allopurinol 300 mg oral tablet: 300 mg = 1 tab(s), Oral, Daily, 0 Refill(s)  amoxicillin 500 mg oral tablet: 500 mg = 1 tab(s), Oral, QID  aspirin 81 mg oral tablet, CHEWABLE: 81 mg = 1 tab(s), Oral, Daily, 0 Refill(s)  atorvastatin 80 mg oral tablet: 80 mg = 1 tab(s), Oral, Daily, 0 Refill(s)  carvedilol 12.5 mg oral tablet: 12.5 mg = 1 tab(s), Oral, BID  carvedilol 25 mg oral tablet: 25 mg = 1 tab(s), Oral, BID  ergocalciferol 50,000 intl units (1.25 mg) oral capsule: 57527 IntUnit = 1 cap(s), Oral  isosorbide MONOnitrate 60 mg oral tablet, Extended Release: 120 mg = 2 tab(s), Oral, Daily  losartan 50 mg oral tablet: 50 mg = 1 tab(s), Oral, Daily  metformin 500 mg oral tablet: 500 mg = 1 tab(s), Oral, Daily, with meals, # 30 tab(s), 0 Refill(s)  nitroglycerin 0.4 mg sublingual TAB: 0.4 mg = 1 tab(s), SL, q5min, PRN PRN for chest pain, x3, 0 Refill(s),    Medications (10) Active  Scheduled: (10)  allopurinol 300 mg Tab UD  300 mg 1 tab(s), Oral, Daily  aspirin 81 mg Chew tab  81 mg 1 tab(s), Oral, Daily  atorvastatin 40 mg Tab  80 mg 2 tab(s), Oral, Daily  carvedilol 12.5 mg Tab UD  25 mg 2 tab(s), Oral, BID  clopidogrel  75 mg Tab UD  75 mg 1 tab(s), Oral, Daily  furosemide 10mg/ml Inj--2ml  20 mg 2 mL, IV Push, Daily  losartan 25 mg Tab UD  50 mg 2 tab(s), Oral, Daily  pantoprazole 40 mg EC Tab  40 mg 1 tab(s), Oral, Daily  ranolazine 500 mg oral ER Tab  500 mg 1 tab(s), Oral, BID  Xarelto 2.5mg tab  1 dose, Oral, BID  Continuous: (0)  PRN: (0)        Physical Examination   General:  Alert and oriented, No acute distress.    Eye:  Extraocular movements are intact.    HENT:  Normocephalic.    Neck:  Supple, Non-tender.    Respiratory:       Breath sounds: Right, Posterior, Base, Diminished.    Cardiovascular:  Normal rate, Regular rhythm, No edema.    Gastrointestinal:  Soft, Non-tender.       Vital Signs (last 24 hrs)_____  Last Charted___________  Temp Oral     36.9 DegC  (NOV 20 11:52)  Heart Rate Peripheral   67 bpm  (NOV 20 11:52)  SBP      H 154mmHg  (NOV 20 11:52)  DBP      86 mmHg  (NOV 20 11:52)  SpO2      97 %  (NOV 20 11:52)  Weight      90.4 kg  (NOV 20 09:59)  Height      170.18 cm  (NOV 20 09:59)     Musculoskeletal:  Normal range of motion.    Integumentary:  Warm, Dry, Intact.    Neurologic:  Alert, Oriented.    Psychiatric:  Cooperative.       Review / Management   Results review:     No qualifying data available.       Impression and Plan   IMPRESSION  HFrEF (EF: 40%)   ECHO (2018)  CAD   s/p CABG (2013) x4   C (2017)  of LCx/OM  HTN  HLD  HOWIE    PLAN  Restart home meds: Coreg, ASA, Imdur, Ranexa, Plavix, Lipitor  ECHO today  Increase Losartan to 100mg PO daily, give additional 50mg now  Give additional 40mg Lasix IVP x 1 dose, will reassess in AM  Needs to be on Lasix PO prior to d/c  Labs in AM: BMP

## 2022-05-14 NOTE — DISCHARGE SUMMARY
Patient:   Minesh Dumont            MRN: [id number]            FIN: [id number]               Age:   66 years     Sex:  Male     :  1955   Associated Diagnoses:   None   Author:   Zenon Castillo MD      See H&P dated today for discharge summary.    For clarification on 2022 patient was placed in observation status under my care.

## 2022-05-14 NOTE — H&P
Patient:   Minesh Dumont            MRN: [id number]            FIN: [id number]               Age:   66 years     Sex:  Male     :  1955   Associated Diagnoses:   None   Author:   Zenon Castillo MD      Basic Information   Source of history:  Medical record.    Present at bedside:  Medical personnel.    Referral source:  Emergency department.    History limitation:  None.    Resusitation Status:  Full Code.       Chief Complaint   SOB    2022 2:10 CDT       Pt into ED with complaints of shortness of breath worsened when he tries to lay down. Pt has a hx of CHF and stent placement        History of Present Illness   66-year-old male with nonischemic cardiomyopathy and EF greater than 40% in 2019 ate a lot of very salty seafood and developed subsequent difficulty breathing and orthopnea.  He came to the ER and chest x-ray showed CHF and patient had an elevated BNP.  He was given 40 mg of IV Lasix initially followed by another 40 mg at about 5:00 this morning.  He is diuresed about 4 L and says his breathing is back to baseline.  He saw Dr. Green 2 weeks ago and had repeat testing done including an echo which according to patient looked good and no medication changes were made at that time.  Plan is to recheck electrolytes at noon time, give any electrolyte replacement as required, and discharge patient to home without medication changes.  He realizes he needs to minimize salt intake.  Blood pressure was quite elevated at presentation but is decreased to almost normal with volume reduction.    PMH: CAD, HOWIE, HLD, HTN, ischemic cardiomyopathy, GERD  PSH: CABG x4 (2013) LIMA to LAD, SVG to second OM, SVG to first OM, SVG to RCA  Family Hx: FatherMI; motherDM; brotherCVA  Social Hx: Former smoker    Previous testing  Carotid US (5.8.19)  Right ICA 60-79% stenosis  Left ICA 1-39% stenosis    Echo (7..18)   LVEF 40%  Mild calcification of the aortic valve is noted with adequate cuspal  excursion  Mild MR    LHC (10.18.17)  Severe native coronary artery disease; native left circumflex/OM  fills antegrade via bridging collaterals and retrograde via R >L laterals  2/4 bypass grafts patent  SVG to RCA patent  SVG to OM1 flush occluded  SVG to OM to flush occluded  LIMA to LAD patent      Review of Systems   Constitutional:  No weakness, No fatigue.    Eye:  No icterus.    Ear/Nose/Mouth/Throat:  No decreased hearing, No sore throat.    Respiratory:  Shortness of breath, No cough, No sputum production.    Cardiovascular:  No chest pain, No peripheral edema.    Gastrointestinal:  No nausea, No vomiting.    Endocrine:  No polyuria.    Musculoskeletal:  No neck pain.    Neurologic:  Alert and oriented X4.    A total of 10 systems reviewed and negative except as noted      Health Status   Current medications:  (Selected)   Inpatient Medications  Ordered  Ambien 5 mg oral tablet: 5 mg, form: Tab, Oral, At Bedtime PRN for insomnia, first dose 04/29/22 6:32:00 CDT, May repeat 30 minutes later once per evening if initial 5mg dose ineffective  Dextrose 50% and Water  (50 mL vial/syringe): 25 mL, 12.5 gm =, form: Injection, IV Push, As Directed PRN for blood glucose, Infuse over: 5 minute(s), first dose 04/29/22 6:32:00 CDT, Unconscious patient: Repeat as ordered per protocol.  Dextrose 50% and Water  (50 mL vial/syringe): 25 mL, 12.5 gm =, form: Injection, IV Push, Once PRN for blood glucose, Infuse over: 5 minute(s), first dose 04/29/22 6:32:00 CDT, Unconscious patient: Look for other source of altered mental status.  Dextrose 50% and Water  (50 mL vial/syringe): 50 mL, 25 gm =, form: Injection, IV Push, As Directed PRN for blood glucose, Infuse over: 5 minute(s), first dose 04/29/22 6:32:00 CDT, Unconscious patient: Repeat as ordered per protocol.  Dextrose 50% in Water intravenous solution: 25 mL, 12.5 gm =, form: Injection, IV Push, As Directed PRN for blood glucose, Infuse over: 5 minute(s), first dose  04/29/22 6:32:00 CDT, Conscious patient.  Dilaudid: 1 mg, form: Injection, IV Push, q4hr PRN for pain, severe, first dose 04/29/22 6:32:00 CDT, User for severe pain or if Pt is NPO with any pain  Dulcolax Laxative 5 mg ORAL enteric coated tablet: 10 mg, form: Tab-EC, Oral, Daily PRN for constipation, first dose 04/29/22 6:32:00 CDT  Lasix inject.  (IV Push or IM) 10 mg/mL: 40 mg, form: Injection, IV Push, Daily, first dose 04/30/22 9:00:00 CDT  Norco  10 mg-325 mg oral tablet: 1 tab(s), form: Tab, Oral, q4hr PRN for pain, first dose 04/29/22 6:32:00 CDT  Pantoprazole 40 mg ORAL EC-Tablet: 40 mg, form: Tab-EC, Oral, Daily, first dose 04/29/22 6:00:00 CDT  Ranexa: 500 mg, form: Tab-ER, Oral, BID, first dose 04/29/22 9:00:00 CDT  Tessalon Perles: 200 mg, form: Cap, Oral, q8hr PRN for cough, first dose 04/29/22 6:32:00 CDT  Tylenol 325 mg oral tablet: 650 mg, form: Tab, Oral, q4hr PRN for fever, first dose 04/29/22 6:32:00 CDT, > 38°C (100.4°F)  Tylenol 325 mg oral tablet: 650 mg, form: Tab, Oral, q4hr PRN for pain, mild, first dose 04/29/22 6:32:00 CDT  Vascepa 1 g oral capsule: 1 capsule, Oral, BID, first dose 04/29/22 9:00:00 CDT  Xalatan 0.005% ophthalmic solution: 1 drop(s), form: Soln-Opth, Eye-Both, Daily, first dose 04/29/22 9:00:00 CDT  Xarelto: 10 mg, form: Tab, Oral, Daily, first dose 04/29/22 9:00:00 CDT  Zofran 2 mg/mL injectable solution: 4 mg, form: Injection, IV Slow, q4hr PRN for nausea/vomiting, first dose 04/29/22 6:32:00 CDT  Zofran ORAL tab: 8 mg, form: Injection, Oral, QID PRN for nausea/vomiting, first dose 04/29/22 6:32:00 CDT, If no IV access  allopurinol: 300 mg, form: Tab, Oral, Daily, first dose 04/29/22 9:00:00 CDT  aspirin 81 mg oral tablet, CHEWABLE: 81 mg, form: Tab-Chew, Chewed, Daily, first dose 04/29/22 9:00:00 CDT  atorvastatin: 80 mg, form: Tab, Oral, Daily, first dose 04/29/22 17:00:00 CDT  carvedilol 12.5 mg oral tablet: 25 mg, form: Tab, Oral, BID, first dose 04/29/22 9:00:00  CDT  cloniDINE 0.1 mg oral tablet: 0.1 mg, form: Tab, Oral, QID PRN for hypertension, first dose 04/29/22 6:32:00 CDT, NOW, PRN SBP > 160  diphenhydrAMINE  IV Push: 25 mg, form: Injection, IV, q4hr PRN for itching, first dose 04/29/22 6:32:00 CDT, Use if itching and Pt is NPO  diphenhydrAMINE  ORAL: 25 mg, form: Cap, Oral, q4hr PRN for itching, first dose 04/29/22 6:32:00 CDT  glucagon: 1 mg, form: Injection, IM, q10min PRN for blood glucose, first dose 04/29/22 6:32:00 CDT, Conscious Patient with NO IV access available and BG < 45 mg/dl.  glucagon: 1 mg, form: Injection, IM, q10min PRN for blood glucose, first dose 04/29/22 6:32:00 CDT, Unconscious patient: Patient with NO IV access available and BG < 70 mg/dl.  hydrALAZINE 20 mg/mL injectable solution: 10 mg, form: Injection, IV, q3hr PRN for hypertension, first dose 04/29/22 6:32:00 CDT, SBP>160, use if NPO or if PRN Clonidine does npot reduce SBP to <160  insulin lispro: 2-14 units, form: Injection, Subcutaneous, As Directed PRN for blood glucose, first dose 04/29/22 6:32:00 CDT  losartan: 50 mg, form: Tab, Oral, Daily, first dose 04/29/22 9:00:00 CDT  metFORMIN: 1,000 mg, form: Tab, Oral, BID, first dose 04/29/22 9:00:00 CDT  nitroglycerin 0.4 mg sublingual TAB: 0.4 mg, form: Tab-SL, SL, q5min PRN for chest pain, first dose 04/29/22 3:57:00 CDT  Prescriptions  Prescribed  Lasix 20 mg oral tablet: 20 mg = 1 tab(s), Oral, Daily, # 30 tab(s), 6 Refill(s), Pharmacy: [id number]  Documented Medications  Documented  LATANOPROST  SOL 0.005%:   Pantoprazole 40 mg ORAL EC-Tablet: 40 mg = 1 tab(s), Oral, Daily  Ranexa 500 mg oral tablet, extended release: 500 mg = 1 tab(s), Oral, BID  Vascepa 1 g oral capsule: 2 gm = 2 cap(s), Oral, BID  Xarelto 2.5 mg oral tablet: 2.5 mg = 1 tab(s), Oral, BID  allopurinol 300 mg oral tablet: 300 mg = 1 tab(s), Oral, Daily, 0 Refill(s)  aspirin 81 mg oral tablet, CHEWABLE: 81 mg = 1 tab(s), Chewed, Daily, 0  Refill(s)  atorvastatin 80 mg oral tablet: 80 mg = 1 tab(s), Oral, Daily, 0 Refill(s)  carvedilol 25 mg oral tablet: 25 mg = 1 tab(s), Oral, BID  isosorbide MONOnitrate 60 mg oral tablet, Extended Release: 120 mg = 2 tab(s), Oral, Daily  losartan 50 mg oral tablet: 50 mg = 1 tab(s), Oral, Daily  metFORMIN 1000 mg oral tablet: 1000 mg = 1 tab(s), Oral, BID  nitroglycerin 0.4 mg sublingual TAB: 0.4 mg = 1 tab(s), SL, q5min, PRN PRN for chest pain, x3, 0 Refill(s)      Histories   Past Medical History:    Active  myocardial infarction ([id number])  Comments:  8/19/2013 CDT 12:03 Daysi Tyler RN  april2013  Hypertension (39571263)  Hypercholesteremia ([id number])  GERD - Gastro-esophageal reflux disease ([id number])  Peripheral vascular disease (8568567794)  Comments:  8/19/2013 CDT 12:07 Daysi Tyler RN  bilateral  Gout (503090917)  Resolved  CAD - Coronary artery disease (6218023180):  Resolved.  Cardiomyopathy (284136881):  Resolved.  DM - Diabetes mellitus (607299235):  Resolved.   Family History:    No family history items have been selected or recorded.   Procedure history:    colonoscopy.  Cholecystectomy (23344135).  Hernia repair (24493104).  PTCA - Percutaneous transluminal coronary angioplasty (6505011385).  CABG - Coronary artery bypass graft (803987685).   Social History        Social & Psychosocial Habits    Alcohol  10/18/2017  Use: Current    Type: Beer, Liquor    Frequency: 1-2 times per week    11/20/2019  Use: Current    Frequency: 1-2 times per week    04/29/2022  Use: Current    Type: Beer, Liquor    Frequency: 3-5 times per week    Has alcohol use interfered with work or home life? No    Has anyone been hurt or at risk by your drinking? No    Concerns about alcohol use in household: No    Substance Use  08/19/2013 Risk Assessment: Denies Substance Abuse    Tobacco  08/19/2013 Risk Assessment: Denies Tobacco Use    08/31/2021  Use: Never (less than  100 in l    Patient Wants Consult For Cessation Counseling N/A    04/29/2022  Use: Never (less than 100 in l    Patient Wants Consult For Cessation Counseling N/A    04/29/2022  Use: Never (less than 100 in l    Patient Wants Consult For Cessation Counseling N/A    Abuse/Neglect  08/31/2021  SHX Any signs of abuse or neglect No    Feels unsafe at home: No    Safe place to go: Yes    04/29/2022  SHX Any signs of abuse or neglect No  .        Physical Examination   General:  Alert and oriented, No acute distress.    Eye:  Extraocular movements are intact, Normal conjunctiva.    HENT:  Normocephalic.    Neck:  Supple, Non-tender.    Respiratory:       Breath sounds: Right, Posterior, Base, Diminished.    Cardiovascular:  Normal rate, Regular rhythm, No edema.    Gastrointestinal:  Soft, Non-tender.    Vital Signs   4/29/2022 8:52 CDT       Temperature Oral          36.8 DegC                             Temperature Oral (calculated)             98.24 DegF                             Peripheral Pulse Rate     71 bpm                             SpO2                      99 %                             Systolic Blood Pressure   141 mmHg  HI  (Modified)                            Diastolic Blood Pressure  80 mmHg  (Modified)                            Mean Arterial Pressure, Cuff              100 mmHg    4/29/2022 8:00 CDT       Respiratory Rate          19 br/min    4/29/2022 5:12 CDT       Heart Rate Monitored      158 bpm  HI                             Systolic Blood Pressure   158 mmHg  HI  (Modified)                            Diastolic Blood Pressure  70 mmHg  (Modified)   4/29/2022 5:00 CDT       Heart Rate Monitored      78 bpm                             Systolic Blood Pressure   158 mmHg  HI                             Diastolic Blood Pressure  70 mmHg                             Mean Arterial Pressure, Cuff              99 mmHg                             Blood Pressure Location   Left arm                              Blood Pressure Cuff Size  Adult    4/29/2022 4:34 CDT       Temperature Oral          36.8 DegC                             Temperature Oral (calculated)             98.24 DegF                             Peripheral Pulse Rate     72 bpm                             SpO2                      97 %                             Systolic Blood Pressure   174 mmHg  HI                             Diastolic Blood Pressure  88 mmHg                             Mean Arterial Pressure, Cuff              117 mmHg    4/29/2022 4:18 CDT       Peripheral Pulse Rate     74 bpm                             Heart Rate Monitored      74 bpm                             Respiratory Rate          26 br/min  HI                             SpO2                      96 %                             Oxygen Therapy            Room air                             Systolic Blood Pressure   156 mmHg  HI                             Diastolic Blood Pressure  86 mmHg                             Mean Arterial Pressure, Cuff              109 mmHg    4/29/2022 4:01 CDT       Peripheral Pulse Rate     76 bpm                             Heart Rate Monitored      76 bpm                             Respiratory Rate          21 br/min                             SpO2                      96 %                             Oxygen Therapy            Room air                             Systolic Blood Pressure   154 mmHg  HI                             Diastolic Blood Pressure  87 mmHg                             Mean Arterial Pressure, Cuff              109 mmHg    4/29/2022 4:00 CDT       Respiratory Rate          21 br/min                             Oxygen Therapy            Room air    4/29/2022 3:47 CDT       Peripheral Pulse Rate     72 bpm                             Heart Rate Monitored      71 bpm                             Respiratory Rate          29 br/min  HI                             SpO2                      96 %                              Oxygen Therapy            Room air                             Systolic Blood Pressure   164 mmHg  HI                             Diastolic Blood Pressure  99 mmHg  HI                             Mean Arterial Pressure, Cuff              121 mmHg    4/29/2022 3:30 CDT       Peripheral Pulse Rate     77 bpm                             Heart Rate Monitored      78 bpm                             Respiratory Rate          24 br/min                             SpO2                      97 %                             Oxygen Therapy            Room air                             Systolic Blood Pressure   186 mmHg  HI                             Diastolic Blood Pressure  89 mmHg                             Mean Arterial Pressure, Cuff              121 mmHg    4/29/2022 3:10 CDT       Peripheral Pulse Rate     81 bpm                             Heart Rate Monitored      79 bpm                             Respiratory Rate          29 br/min  HI                             SpO2                      97 %                             Oxygen Therapy            Room air    4/29/2022 3:00 CDT       Peripheral Pulse Rate     82 bpm                             SpO2                      96 %                             Oxygen Therapy            Room air                             Systolic Blood Pressure   179 mmHg  HI                             Diastolic Blood Pressure  94 mmHg  HI                             Mean Arterial Pressure, Cuff              122 mmHg    4/29/2022 2:30 CDT       Systolic Blood Pressure   184 mmHg  HI                             Diastolic Blood Pressure  109 mmHg  HI                             Mean Arterial Pressure, Cuff              134 mmHg    4/29/2022 2:10 CDT       Temperature Oral          36.7 DegC                             Temperature Oral (calculated)             98.06 DegF                             Peripheral Pulse Rate     92 bpm                             Respiratory Rate          20  br/min                             SpO2                      95 %                             Oxygen Therapy            Room air                             Systolic Blood Pressure   214 mmHg  HI                             Diastolic Blood Pressure  123 mmHg  HI        Vital Signs (last 24 hrs)_____  Last Charted___________  Temp Oral     36.8 DegC  (APR 29 08:52)  Heart Rate Peripheral   71 bpm  (APR 29 08:52)  Resp Rate         19 br/min  (APR 29 08:00)  SBP      H 141mmHg  (APR 29 08:52)  DBP      80 mmHg  (APR 29 08:52)  SpO2      99 %  (APR 29 08:52)  Weight      85.7 kg  (APR 29 04:38)  Height      172.72 cm  (APR 29 04:38)  BMI      28.73  (APR 29 04:38)     Musculoskeletal:  Normal range of motion.    Integumentary:  Warm, Dry, Intact.    Neurologic:  Alert, Oriented.    Psychiatric:  Cooperative.      General: well-developed well-nourished in no acute distress  Eye: PERRLA, EOMI, clear conjunctiva, eyelids normal  HENT: Oropharynx without erythema/exudate, oropharynx and nasal mucosal surfaces moist  Neck: No thyromegaly or lymphadenopathy  Respiratory: clear to auscultation bilaterally  Cardiovascular: regular rate and rhythm without murmurs, gallops or rubs  Gastrointestinal: soft, non-tender, non-distended with normal bowel sounds, without masses to palpation  Integumentary: no rashes or skin lesions present  Neurologic: cranial nerves grossly intact, no signs of peripheral neurological deficit  Psych: Appropriate affect, not anxious or depressed      Impression and Plan   IMPRESSION  Acute likely diastolic CHF exacerbation from increased salt intake  -Patient has had an approximate 4 L diuresis and breathing is back to baseline  -Patient has been educated on the importance of minimizing salt intake to 2 g a day  -No changes in medications planned at discharge    CAD   s/p CABG (2013) x4   LHC (2017)  of LCx/OM  HTN  HLD  HOWIE    Total discharge time less than 30 minutes

## 2022-07-24 ENCOUNTER — HOSPITAL ENCOUNTER (EMERGENCY)
Facility: HOSPITAL | Age: 67
Discharge: HOME | End: 2022-07-24
Attending: SPECIALIST
Payer: COMMERCIAL

## 2022-07-24 VITALS
HEIGHT: 66 IN | OXYGEN SATURATION: 97 % | DIASTOLIC BLOOD PRESSURE: 75 MMHG | SYSTOLIC BLOOD PRESSURE: 145 MMHG | BODY MASS INDEX: 28.93 KG/M2 | HEART RATE: 69 BPM | WEIGHT: 180 LBS | TEMPERATURE: 98 F | RESPIRATION RATE: 16 BRPM

## 2022-07-24 DIAGNOSIS — S39.012A LUMBAR STRAIN, INITIAL ENCOUNTER: Primary | ICD-10-CM

## 2022-07-24 PROCEDURE — 99284 EMERGENCY DEPT VISIT MOD MDM: CPT

## 2022-07-24 PROCEDURE — 96372 THER/PROPH/DIAG INJ SC/IM: CPT | Performed by: SPECIALIST

## 2022-07-24 PROCEDURE — 63600175 PHARM REV CODE 636 W HCPCS: Performed by: SPECIALIST

## 2022-07-24 RX ORDER — BETAMETHASONE SODIUM PHOSPHATE AND BETAMETHASONE ACETATE 3; 3 MG/ML; MG/ML
12 INJECTION, SUSPENSION INTRA-ARTICULAR; INTRALESIONAL; INTRAMUSCULAR; SOFT TISSUE
Status: COMPLETED | OUTPATIENT
Start: 2022-07-24 | End: 2022-07-24

## 2022-07-24 RX ADMIN — BETAMETHASONE ACETATE AND BETAMETHASONE SODIUM PHOSPHATE 12 MG: 3; 3 INJECTION, SUSPENSION INTRA-ARTICULAR; INTRALESIONAL; INTRAMUSCULAR; SOFT TISSUE at 03:07

## 2022-07-24 NOTE — ED PROVIDER NOTES
"Encounter Date: 7/24/2022       History     Chief Complaint   Patient presents with    Back Pain     Reports low back pain since yesterday after bending down  to pick something up. Pt reports "having back pain yearly and is due for a cortisone shot."     Patient reports occasional back pain and notes bending down yesterday and felt a pain in his lower back; pain with movement, no focal deficits or neuropathy        Review of patient's allergies indicates:  No Known Allergies  No past medical history on file.  No past surgical history on file.  No family history on file.     Review of Systems   Constitutional: Negative.    Respiratory: Negative.    Cardiovascular: Negative.    Gastrointestinal: Negative.    Genitourinary: Negative.    Musculoskeletal: Positive for back pain.   Neurological: Negative.        Physical Exam     Initial Vitals [07/24/22 0314]   BP Pulse Resp Temp SpO2   (!) 145/75 69 16 97.9 °F (36.6 °C) 97 %      MAP       --         Physical Exam    Nursing note and vitals reviewed.  Constitutional: He appears well-developed and well-nourished.   HENT:   Head: Normocephalic and atraumatic.   Eyes: EOM are normal. Pupils are equal, round, and reactive to light.   Neck: Neck supple.   Normal range of motion.  Cardiovascular: Normal rate, regular rhythm and normal heart sounds.   Pulmonary/Chest: Breath sounds normal.   Abdominal: Abdomen is soft. Bowel sounds are normal.   Musculoskeletal:         General: Normal range of motion.      Cervical back: Normal range of motion and neck supple.      Comments: Bilateral lumbar paraspinous tenderness, no spinous tenderness, no swelling     Neurological: He is alert and oriented to person, place, and time.   Skin: Skin is warm and dry.         ED Course   Procedures  Labs Reviewed - No data to display       Imaging Results    None          Medications   betamethasone acetate-betamethasone sodium phosphate injection 12 mg (has no administration in time range) "                          Clinical Impression:   Final diagnoses:  [S39.012A] Lumbar strain, initial encounter (Primary)          ED Disposition Condition    Discharge Stable        ED Prescriptions     None        Follow-up Information     Follow up With Specialties Details Why Contact Info    Lisa Conroy NP Family Medicine  As needed [address] [place] [address]  [phone]             Sang Walter MD  07/24/22 [id number]

## 2022-10-14 ENCOUNTER — LAB VISIT (OUTPATIENT)
Dept: LAB | Facility: HOSPITAL | Age: 67
End: 2022-10-14
Attending: NURSE PRACTITIONER
Payer: COMMERCIAL

## 2022-10-14 DIAGNOSIS — Z00.00 ROUTINE GENERAL MEDICAL EXAMINATION AT A HEALTH CARE FACILITY: Primary | ICD-10-CM

## 2022-10-14 DIAGNOSIS — Z12.5 SPECIAL SCREENING, PROSTATE CANCER: ICD-10-CM

## 2022-10-14 DIAGNOSIS — E78.2 MIXED HYPERLIPIDEMIA: ICD-10-CM

## 2022-10-14 DIAGNOSIS — E11.9 DIABETES MELLITUS WITHOUT COMPLICATION: ICD-10-CM

## 2022-10-14 LAB
ALBUMIN SERPL-MCNC: 4.2 GM/DL (ref 3.4–4.8)
ALBUMIN/GLOB SERPL: 1.3 RATIO (ref 1.1–2)
ALP SERPL-CCNC: 55 UNIT/L (ref 40–150)
ALT SERPL-CCNC: 21 UNIT/L (ref 0–55)
AST SERPL-CCNC: 22 UNIT/L (ref 5–34)
BASOPHILS # BLD AUTO: 0.04 X10(3)/MCL (ref 0–0.2)
BASOPHILS NFR BLD AUTO: 0.8 %
BILIRUBIN DIRECT+TOT PNL SERPL-MCNC: 0.5 MG/DL
BUN SERPL-MCNC: 12.5 MG/DL (ref 8.4–25.7)
CALCIUM SERPL-MCNC: 9.9 MG/DL (ref 8.8–10)
CHLORIDE SERPL-SCNC: 106 MMOL/L (ref 98–107)
CHOLEST SERPL-MCNC: 142 MG/DL
CHOLEST/HDLC SERPL: 4 {RATIO} (ref 0–5)
CO2 SERPL-SCNC: 27 MMOL/L (ref 23–31)
CREAT SERPL-MCNC: 0.88 MG/DL (ref 0.73–1.18)
EOSINOPHIL # BLD AUTO: 0.36 X10(3)/MCL (ref 0–0.9)
EOSINOPHIL NFR BLD AUTO: 6.9 %
ERYTHROCYTE [DISTWIDTH] IN BLOOD BY AUTOMATED COUNT: 12.4 % (ref 11.5–17)
EST. AVERAGE GLUCOSE BLD GHB EST-MCNC: 116.9 MG/DL
GFR SERPLBLD CREATININE-BSD FMLA CKD-EPI: >60 MLS/MIN/1.73/M2
GLOBULIN SER-MCNC: 3.3 GM/DL (ref 2.4–3.5)
GLUCOSE SERPL-MCNC: 116 MG/DL (ref 82–115)
HBA1C MFR BLD: 5.7 %
HCT VFR BLD AUTO: 44.2 % (ref 42–52)
HDLC SERPL-MCNC: 34 MG/DL (ref 35–60)
HGB BLD-MCNC: 14.2 GM/DL (ref 14–18)
IMM GRANULOCYTES # BLD AUTO: 0 X10(3)/MCL (ref 0–0.04)
IMM GRANULOCYTES NFR BLD AUTO: 0 %
LDLC SERPL CALC-MCNC: 71 MG/DL (ref 50–140)
LYMPHOCYTES # BLD AUTO: 1.33 X10(3)/MCL (ref 0.6–4.6)
LYMPHOCYTES NFR BLD AUTO: 25.7 %
MCH RBC QN AUTO: 30.1 PG (ref 27–31)
MCHC RBC AUTO-ENTMCNC: 32.1 MG/DL (ref 33–36)
MCV RBC AUTO: 93.6 FL (ref 80–94)
MONOCYTES # BLD AUTO: 0.45 X10(3)/MCL (ref 0.1–1.3)
MONOCYTES NFR BLD AUTO: 8.7 %
NEUTROPHILS # BLD AUTO: 3 X10(3)/MCL (ref 2.1–9.2)
NEUTROPHILS NFR BLD AUTO: 57.9 %
PLATELET # BLD AUTO: 250 X10(3)/MCL (ref 130–400)
PMV BLD AUTO: 8.6 FL (ref 7.4–10.4)
POTASSIUM SERPL-SCNC: 4.7 MMOL/L (ref 3.5–5.1)
PROT SERPL-MCNC: 7.5 GM/DL (ref 5.8–7.6)
PSA SERPL-MCNC: 0.67 NG/ML
RBC # BLD AUTO: 4.72 X10(6)/MCL (ref 4.7–6.1)
SODIUM SERPL-SCNC: 145 MMOL/L (ref 136–145)
T4 FREE SERPL-MCNC: 0.89 NG/DL (ref 0.7–1.48)
TRIGL SERPL-MCNC: 185 MG/DL (ref 34–140)
TSH SERPL-ACNC: 2.5 UIU/ML (ref 0.35–4.94)
VLDLC SERPL CALC-MCNC: 37 MG/DL
WBC # SPEC AUTO: 5.2 X10(3)/MCL (ref 4.5–11.5)

## 2022-10-14 PROCEDURE — 84443 ASSAY THYROID STIM HORMONE: CPT

## 2022-10-14 PROCEDURE — 83036 HEMOGLOBIN GLYCOSYLATED A1C: CPT

## 2022-10-14 PROCEDURE — 36415 COLL VENOUS BLD VENIPUNCTURE: CPT

## 2022-10-14 PROCEDURE — 84153 ASSAY OF PSA TOTAL: CPT

## 2022-10-14 PROCEDURE — 84439 ASSAY OF FREE THYROXINE: CPT

## 2022-10-14 PROCEDURE — 80053 COMPREHEN METABOLIC PANEL: CPT

## 2022-10-14 PROCEDURE — 80061 LIPID PANEL: CPT

## 2022-10-14 PROCEDURE — 85025 COMPLETE CBC W/AUTO DIFF WBC: CPT

## 2022-11-06 ENCOUNTER — HOSPITAL ENCOUNTER (EMERGENCY)
Facility: HOSPITAL | Age: 67
Discharge: HOME | End: 2022-11-06
Attending: EMERGENCY MEDICINE
Payer: COMMERCIAL

## 2022-11-06 VITALS
HEART RATE: 80 BPM | RESPIRATION RATE: 20 BRPM | HEIGHT: 67 IN | OXYGEN SATURATION: 99 % | DIASTOLIC BLOOD PRESSURE: 95 MMHG | SYSTOLIC BLOOD PRESSURE: 176 MMHG | WEIGHT: 185 LBS | TEMPERATURE: 98 F | BODY MASS INDEX: 29.03 KG/M2

## 2022-11-06 DIAGNOSIS — I73.9 PERIPHERAL VASCULAR DISEASE: ICD-10-CM

## 2022-11-06 DIAGNOSIS — M79.604 PAIN OF RIGHT LOWER EXTREMITY: Primary | ICD-10-CM

## 2022-11-06 PROCEDURE — 99281 EMR DPT VST MAYX REQ PHY/QHP: CPT

## 2022-11-06 NOTE — ED PROVIDER NOTES
Encounter Date: 11/6/2022       History     Chief Complaint   Patient presents with    Leg Pain     Complains of right medial leg pain from his knee to his ankle x 3 weeks. Denies injury     This 67-year-old man presents to the emergency room with complaints of right medial leg pain from his knee down to his ankle for the past 3 weeks.  He states when the pain occurs he has to stop and rub his right leg and that usually helps it.  He has a history of coronary artery disease status post stents, and he has stents in his left leg.  He states he has not noted that it occurs when he is walking consistently.  And does not mention having to rest to relieve it.     Review of patient's allergies indicates:   Allergen Reactions    Ace inhibitors      Other reaction(s): Dry cough  Other reaction(s): Dry cough       Past Medical History:   Diagnosis Date    CHF (congestive heart failure)     Coronary artery disease     Diabetes mellitus     GERD (gastroesophageal reflux disease)     Gout, unspecified     Hypertension     Mixed hyperlipidemia      Past Surgical History:   Procedure Laterality Date    CORONARY ARTERY BYPASS GRAFT       History reviewed. No pertinent family history.  Social History     Tobacco Use    Smoking status: Never    Smokeless tobacco: Never   Substance Use Topics    Alcohol use: Yes     Comment: occas     Review of Systems   Constitutional:  Negative for fever.   HENT:  Negative for sore throat.    Respiratory:  Negative for shortness of breath.    Cardiovascular:  Negative for chest pain.   Gastrointestinal:  Negative for nausea.   Genitourinary:  Negative for dysuria.   Musculoskeletal:  Negative for back pain.   Skin:  Negative for rash.   Neurological:  Negative for weakness.   Hematological:  Does not bruise/bleed easily.     Physical Exam     Initial Vitals [11/06/22 0803]   BP Pulse Resp Temp SpO2   (!) 176/95 80 20 98.1 °F (36.7 °C) 99 %      MAP       --         Physical Exam    Constitutional:  He appears well-developed and well-nourished.   HENT:   Head: Normocephalic and atraumatic.   Mouth/Throat: Mucous membranes are normal.   Eyes: EOM are normal. Pupils are equal, round, and reactive to light.   Neck: Neck supple.   Normal range of motion.  Cardiovascular:  Normal rate, regular rhythm, normal heart sounds and intact distal pulses.           Pulmonary/Chest: Breath sounds normal.   Abdominal: Abdomen is soft. Bowel sounds are normal.   Musculoskeletal:         General: Normal range of motion.      Cervical back: Normal range of motion and neck supple.     Neurological: He is alert and oriented to person, place, and time. He has normal strength.   Skin: Skin is warm and dry. Capillary refill takes 2 to 3 seconds.   In the left foot delayed cap refill at 2-3 seconds but foot is warm. While I could not palpate a dorsalis pedis pulse we were able to locate pulses with doppler stronger at the ankle   Psychiatric: He has a normal mood and affect. His behavior is normal. Judgment and thought content normal.       ED Course   Procedures  Labs Reviewed - No data to display       Imaging Results    None          Medications - No data to display                           Clinical Impression:   Final diagnoses:  [M79.604] Pain of right lower extremity (Primary)  [I73.9] Peripheral vascular disease      ED Disposition Condition    Discharge Stable          ED Prescriptions    None       Follow-up Information       Follow up With Specialties Details Why Contact Info    Laura Green MD Cardiovascular Disease, Cardiology Schedule an appointment as soon as possible for a visit   [address] [facility] [address]  [phone]               Thiago Mcclure MD  11/06/22 7224

## 2022-12-14 ENCOUNTER — HOSPITAL ENCOUNTER (OUTPATIENT)
Dept: RADIOLOGY | Facility: HOSPITAL | Age: 67
Discharge: HOME | End: 2022-12-14
Attending: NURSE PRACTITIONER
Payer: COMMERCIAL

## 2022-12-14 DIAGNOSIS — M25.561 PAIN IN RIGHT KNEE: ICD-10-CM

## 2022-12-14 DIAGNOSIS — M25.561 PAIN IN RIGHT KNEE: Primary | ICD-10-CM

## 2022-12-14 PROCEDURE — 73560 X-RAY EXAM OF KNEE 1 OR 2: CPT | Mod: TC,RT

## 2023-01-09 ENCOUNTER — LAB VISIT (OUTPATIENT)
Dept: LAB | Facility: HOSPITAL | Age: 68
End: 2023-01-09
Attending: INTERNAL MEDICINE
Payer: COMMERCIAL

## 2023-01-09 DIAGNOSIS — I10 ESSENTIAL HYPERTENSION, MALIGNANT: Primary | ICD-10-CM

## 2023-01-09 LAB
ALBUMIN SERPL-MCNC: 4.3 G/DL (ref 3.4–4.8)
ALBUMIN/GLOB SERPL: 1.4 RATIO (ref 1.1–2)
ALP SERPL-CCNC: 64 UNIT/L (ref 40–150)
ALT SERPL-CCNC: 31 UNIT/L (ref 0–55)
AST SERPL-CCNC: 33 UNIT/L (ref 5–34)
BILIRUBIN DIRECT+TOT PNL SERPL-MCNC: 0.7 MG/DL
BUN SERPL-MCNC: 13.9 MG/DL (ref 8.4–25.7)
CALCIUM SERPL-MCNC: 9.7 MG/DL (ref 8.8–10)
CHLORIDE SERPL-SCNC: 103 MMOL/L (ref 98–107)
CHOLEST SERPL-MCNC: 161 MG/DL
CHOLEST/HDLC SERPL: 4 {RATIO} (ref 0–5)
CO2 SERPL-SCNC: 29 MMOL/L (ref 23–31)
CREAT SERPL-MCNC: 0.96 MG/DL (ref 0.73–1.18)
GFR SERPLBLD CREATININE-BSD FMLA CKD-EPI: 87 MLS/MIN/1.73/M2
GLOBULIN SER-MCNC: 3.1 GM/DL (ref 2.4–3.5)
GLUCOSE SERPL-MCNC: 95 MG/DL (ref 82–115)
HDLC SERPL-MCNC: 43 MG/DL (ref 35–60)
LDLC SERPL CALC-MCNC: 81 MG/DL (ref 50–140)
POTASSIUM SERPL-SCNC: 4.2 MMOL/L (ref 3.5–5.1)
PROT SERPL-MCNC: 7.4 GM/DL (ref 5.8–7.6)
SODIUM SERPL-SCNC: 143 MMOL/L (ref 136–145)
TRIGL SERPL-MCNC: 187 MG/DL (ref 34–140)
VLDLC SERPL CALC-MCNC: 37 MG/DL

## 2023-01-09 PROCEDURE — 80053 COMPREHEN METABOLIC PANEL: CPT

## 2023-01-09 PROCEDURE — 36415 COLL VENOUS BLD VENIPUNCTURE: CPT

## 2023-01-09 PROCEDURE — 80061 LIPID PANEL: CPT

## 2023-04-24 ENCOUNTER — LAB VISIT (OUTPATIENT)
Dept: LAB | Facility: HOSPITAL | Age: 68
End: 2023-04-24
Attending: NURSE PRACTITIONER
Payer: COMMERCIAL

## 2023-04-24 DIAGNOSIS — E11.9 DIABETES MELLITUS WITHOUT COMPLICATION: ICD-10-CM

## 2023-04-24 DIAGNOSIS — Z00.00 ROUTINE GENERAL MEDICAL EXAMINATION AT A HEALTH CARE FACILITY: Primary | ICD-10-CM

## 2023-04-24 DIAGNOSIS — E78.2 MIXED HYPERLIPIDEMIA: ICD-10-CM

## 2023-04-24 LAB
ALBUMIN SERPL-MCNC: 4.2 G/DL (ref 3.4–4.8)
ALBUMIN/GLOB SERPL: 1.3 RATIO (ref 1.1–2)
ALP SERPL-CCNC: 58 UNIT/L (ref 40–150)
ALT SERPL-CCNC: 21 UNIT/L (ref 0–55)
AST SERPL-CCNC: 20 UNIT/L (ref 5–34)
BASOPHILS # BLD AUTO: 0.03 X10(3)/MCL (ref 0–0.2)
BASOPHILS NFR BLD AUTO: 0.5 %
BILIRUBIN DIRECT+TOT PNL SERPL-MCNC: 0.9 MG/DL
BUN SERPL-MCNC: 17.4 MG/DL (ref 8.4–25.7)
CALCIUM SERPL-MCNC: 9.4 MG/DL (ref 8.8–10)
CHLORIDE SERPL-SCNC: 105 MMOL/L (ref 98–107)
CHOLEST SERPL-MCNC: 112 MG/DL
CHOLEST/HDLC SERPL: 3 {RATIO} (ref 0–5)
CO2 SERPL-SCNC: 28 MMOL/L (ref 23–31)
CREAT SERPL-MCNC: 0.95 MG/DL (ref 0.73–1.18)
EOSINOPHIL # BLD AUTO: 0.25 X10(3)/MCL (ref 0–0.9)
EOSINOPHIL NFR BLD AUTO: 3.9 %
ERYTHROCYTE [DISTWIDTH] IN BLOOD BY AUTOMATED COUNT: 12.7 % (ref 11.5–17)
EST. AVERAGE GLUCOSE BLD GHB EST-MCNC: 114 MG/DL
GFR SERPLBLD CREATININE-BSD FMLA CKD-EPI: >60 MLS/MIN/1.73/M2
GLOBULIN SER-MCNC: 3.2 GM/DL (ref 2.4–3.5)
GLUCOSE SERPL-MCNC: 112 MG/DL (ref 82–115)
HBA1C MFR BLD: 5.6 %
HCT VFR BLD AUTO: 41.9 % (ref 42–52)
HDLC SERPL-MCNC: 41 MG/DL (ref 35–60)
HGB BLD-MCNC: 13.6 G/DL (ref 14–18)
IMM GRANULOCYTES # BLD AUTO: 0.01 X10(3)/MCL (ref 0–0.04)
IMM GRANULOCYTES NFR BLD AUTO: 0.2 %
LDLC SERPL CALC-MCNC: 52 MG/DL (ref 50–140)
LYMPHOCYTES # BLD AUTO: 1.19 X10(3)/MCL (ref 0.6–4.6)
LYMPHOCYTES NFR BLD AUTO: 18.4 %
MCH RBC QN AUTO: 30.2 PG (ref 27–31)
MCHC RBC AUTO-ENTMCNC: 32.5 G/DL (ref 33–36)
MCV RBC AUTO: 92.9 FL (ref 80–94)
MONOCYTES # BLD AUTO: 0.56 X10(3)/MCL (ref 0.1–1.3)
MONOCYTES NFR BLD AUTO: 8.7 %
NEUTROPHILS # BLD AUTO: 4.43 X10(3)/MCL (ref 2.1–9.2)
NEUTROPHILS NFR BLD AUTO: 68.3 %
PLATELET # BLD AUTO: 254 X10(3)/MCL (ref 130–400)
PMV BLD AUTO: 9.4 FL (ref 7.4–10.4)
POTASSIUM SERPL-SCNC: 4.1 MMOL/L (ref 3.5–5.1)
PROT SERPL-MCNC: 7.4 GM/DL (ref 5.8–7.6)
RBC # BLD AUTO: 4.51 X10(6)/MCL (ref 4.7–6.1)
SODIUM SERPL-SCNC: 143 MMOL/L (ref 136–145)
TRIGL SERPL-MCNC: 95 MG/DL (ref 34–140)
TSH SERPL-ACNC: 2.51 UIU/ML (ref 0.35–4.94)
VLDLC SERPL CALC-MCNC: 19 MG/DL
WBC # SPEC AUTO: 6.5 X10(3)/MCL (ref 4.5–11.5)

## 2023-04-24 PROCEDURE — 36415 COLL VENOUS BLD VENIPUNCTURE: CPT

## 2023-04-24 PROCEDURE — 83036 HEMOGLOBIN GLYCOSYLATED A1C: CPT

## 2023-04-24 PROCEDURE — 80061 LIPID PANEL: CPT

## 2023-04-24 PROCEDURE — 85025 COMPLETE CBC W/AUTO DIFF WBC: CPT

## 2023-04-24 PROCEDURE — 84443 ASSAY THYROID STIM HORMONE: CPT

## 2023-04-24 PROCEDURE — 80053 COMPREHEN METABOLIC PANEL: CPT

## 2023-06-05 ENCOUNTER — LAB VISIT (OUTPATIENT)
Dept: LAB | Facility: HOSPITAL | Age: 68
End: 2023-06-05
Attending: INTERNAL MEDICINE
Payer: COMMERCIAL

## 2023-06-05 DIAGNOSIS — I10 ESSENTIAL HYPERTENSION, MALIGNANT: Primary | ICD-10-CM

## 2023-06-05 LAB
ALBUMIN SERPL-MCNC: 4.5 G/DL (ref 3.4–4.8)
ALBUMIN/GLOB SERPL: 1.5 RATIO (ref 1.1–2)
ALP SERPL-CCNC: 54 UNIT/L (ref 40–150)
ALT SERPL-CCNC: 17 UNIT/L (ref 0–55)
AST SERPL-CCNC: 18 UNIT/L (ref 5–34)
BILIRUBIN DIRECT+TOT PNL SERPL-MCNC: 0.8 MG/DL
BUN SERPL-MCNC: 18.6 MG/DL (ref 8.4–25.7)
CALCIUM SERPL-MCNC: 9.7 MG/DL (ref 8.8–10)
CHLORIDE SERPL-SCNC: 101 MMOL/L (ref 98–107)
CHOLEST SERPL-MCNC: 115 MG/DL
CHOLEST/HDLC SERPL: 3 {RATIO} (ref 0–5)
CO2 SERPL-SCNC: 30 MMOL/L (ref 23–31)
CREAT SERPL-MCNC: 1.03 MG/DL (ref 0.73–1.18)
GFR SERPLBLD CREATININE-BSD FMLA CKD-EPI: >60 MLS/MIN/1.73/M2
GLOBULIN SER-MCNC: 3 GM/DL (ref 2.4–3.5)
GLUCOSE SERPL-MCNC: 109 MG/DL (ref 82–115)
HDLC SERPL-MCNC: 41 MG/DL (ref 35–60)
LDLC SERPL CALC-MCNC: 47 MG/DL (ref 50–140)
POTASSIUM SERPL-SCNC: 3.9 MMOL/L (ref 3.5–5.1)
PROT SERPL-MCNC: 7.5 GM/DL (ref 5.8–7.6)
SODIUM SERPL-SCNC: 141 MMOL/L (ref 136–145)
TRIGL SERPL-MCNC: 134 MG/DL (ref 34–140)
VLDLC SERPL CALC-MCNC: 27 MG/DL

## 2023-06-05 PROCEDURE — 80061 LIPID PANEL: CPT

## 2023-06-05 PROCEDURE — 80053 COMPREHEN METABOLIC PANEL: CPT

## 2023-06-05 PROCEDURE — 36415 COLL VENOUS BLD VENIPUNCTURE: CPT

## 2023-08-16 DIAGNOSIS — M25.561 RIGHT KNEE PAIN: Primary | ICD-10-CM

## 2023-08-18 DIAGNOSIS — M79.661 PAIN IN RIGHT LOWER LEG: Primary | ICD-10-CM

## 2023-09-08 ENCOUNTER — LAB VISIT (OUTPATIENT)
Dept: LAB | Facility: HOSPITAL | Age: 68
End: 2023-09-08
Attending: INTERNAL MEDICINE
Payer: COMMERCIAL

## 2023-09-08 DIAGNOSIS — E78.5 HYPERLIPIDEMIA, UNSPECIFIED HYPERLIPIDEMIA TYPE: Primary | ICD-10-CM

## 2023-09-08 DIAGNOSIS — I10 ESSENTIAL HYPERTENSION, MALIGNANT: ICD-10-CM

## 2023-09-08 LAB
ANION GAP SERPL CALC-SCNC: 9 MEQ/L
BUN SERPL-MCNC: 20 MG/DL (ref 8.4–25.7)
CALCIUM SERPL-MCNC: 9.7 MG/DL (ref 8.8–10)
CHLORIDE SERPL-SCNC: 105 MMOL/L (ref 98–107)
CO2 SERPL-SCNC: 27 MMOL/L (ref 23–31)
CREAT SERPL-MCNC: 1.06 MG/DL (ref 0.73–1.18)
CREAT/UREA NIT SERPL: 19
GFR SERPLBLD CREATININE-BSD FMLA CKD-EPI: >60 MLS/MIN/1.73/M2
GLUCOSE SERPL-MCNC: 110 MG/DL (ref 82–115)
POTASSIUM SERPL-SCNC: 4.5 MMOL/L (ref 3.5–5.1)
SODIUM SERPL-SCNC: 141 MMOL/L (ref 136–145)

## 2023-09-08 PROCEDURE — 80048 BASIC METABOLIC PNL TOTAL CA: CPT

## 2023-09-08 PROCEDURE — 36415 COLL VENOUS BLD VENIPUNCTURE: CPT

## 2023-10-05 DIAGNOSIS — M25.561 PAIN IN RIGHT KNEE: Primary | ICD-10-CM

## 2023-10-30 DIAGNOSIS — M25.561 RIGHT KNEE PAIN: Primary | ICD-10-CM

## 2024-01-05 ENCOUNTER — HOSPITAL ENCOUNTER (EMERGENCY)
Facility: HOSPITAL | Age: 69
Discharge: HOME | End: 2024-01-05
Attending: STUDENT IN AN ORGANIZED HEALTH CARE EDUCATION/TRAINING PROGRAM
Payer: MEDICARE

## 2024-01-05 VITALS
TEMPERATURE: 98 F | SYSTOLIC BLOOD PRESSURE: 175 MMHG | WEIGHT: 198 LBS | HEIGHT: 66 IN | BODY MASS INDEX: 31.82 KG/M2 | RESPIRATION RATE: 18 BRPM | DIASTOLIC BLOOD PRESSURE: 82 MMHG | HEART RATE: 87 BPM | OXYGEN SATURATION: 100 %

## 2024-01-05 DIAGNOSIS — U07.1 COVID-19: Primary | ICD-10-CM

## 2024-01-05 LAB
FLUAV AG UPPER RESP QL IA.RAPID: NOT DETECTED
FLUBV AG UPPER RESP QL IA.RAPID: NOT DETECTED
SARS-COV-2 RNA RESP QL NAA+PROBE: DETECTED

## 2024-01-05 PROCEDURE — 0240U COVID/FLU A&B PCR: CPT

## 2024-01-05 PROCEDURE — 99283 EMERGENCY DEPT VISIT LOW MDM: CPT

## 2024-01-05 RX ORDER — NIRMATRELVIR AND RITONAVIR 300-100 MG
KIT ORAL
Qty: 5 TABLET | Refills: 0 | Status: SHIPPED | OUTPATIENT
Start: 2024-01-05

## 2024-01-05 NOTE — DISCHARGE INSTRUCTIONS
Pt will be discharged home.  Alternate tylenol and motrin for 24 hrs. Mucinex DM 24 hr for cough congestion.  Increase fluids.  Take medications as prescribed.  Return to ER for any worsening conditions.

## 2024-01-05 NOTE — Clinical Note
"Minesh Massey" Tim was seen and treated in our emergency department on 1/5/2024.  He may return to work on 01/10/2024.       If you have any questions or concerns, please don't hesitate to call.      Douglas Odonnell MD"

## 2024-01-05 NOTE — ED PROVIDER NOTES
Encounter Date: 1/5/2024       History     Chief Complaint   Patient presents with    Nasal Congestion     Nasal congestion started today with cough.      Post nasal congestion, wife diagnosed with covid on Monday.     The history is provided by the patient. No  was used.     Review of patient's allergies indicates:   Allergen Reactions    Ace inhibitors      Other reaction(s): Dry cough  Other reaction(s): Dry cough       Past Medical History:   Diagnosis Date    CHF (congestive heart failure)     Coronary artery disease     Diabetes mellitus     GERD (gastroesophageal reflux disease)     Gout, unspecified     Hypertension     Mixed hyperlipidemia      Past Surgical History:   Procedure Laterality Date    CORONARY ARTERY BYPASS GRAFT       No family history on file.  Social History     Tobacco Use    Smoking status: Never    Smokeless tobacco: Never   Substance Use Topics    Alcohol use: Yes     Comment: occas     Review of Systems   Constitutional: Negative.    HENT:  Positive for congestion, postnasal drip and rhinorrhea.    Eyes: Negative.    Respiratory:  Positive for cough.    Cardiovascular: Negative.    Gastrointestinal: Negative.    Endocrine: Negative.    Genitourinary: Negative.    Musculoskeletal: Negative.    Skin: Negative.    Allergic/Immunologic: Negative.    Neurological: Negative.    Hematological: Negative.    Psychiatric/Behavioral: Negative.     All other systems reviewed and are negative.      Physical Exam     Initial Vitals [01/05/24 1407]   BP Pulse Resp Temp SpO2   (!) 175/82 87 18 98.1 °F (36.7 °C) 100 %      MAP       --         Physical Exam    Nursing note and vitals reviewed.  Constitutional: He appears well-developed and well-nourished.   HENT:   Head: Normocephalic and atraumatic.   Right Ear: External ear normal.   Left Ear: External ear normal.   Nose: Nose normal.   Mouth/Throat: Oropharynx is clear and moist.   Eyes: Conjunctivae and EOM are normal. Pupils are  equal, round, and reactive to light.   Neck: Neck supple.   Normal range of motion.  Cardiovascular:  Normal rate, regular rhythm, normal heart sounds and intact distal pulses.           Pulmonary/Chest: Breath sounds normal.   Abdominal: Abdomen is soft. Bowel sounds are normal.   Musculoskeletal:         General: Normal range of motion.      Cervical back: Normal range of motion and neck supple.     Neurological: He is alert and oriented to person, place, and time. He has normal strength and normal reflexes. GCS score is 15. GCS eye subscore is 4. GCS verbal subscore is 5. GCS motor subscore is 6.   Skin: Skin is dry. Capillary refill takes less than 2 seconds.   Psychiatric: He has a normal mood and affect. His behavior is normal. Judgment and thought content normal.         ED Course   Procedures  Labs Reviewed   COVID/FLU A&B PCR - Abnormal; Notable for the following components:       Result Value    SARS-CoV-2 PCR Detected (*)     All other components within normal limits    Narrative:     The Xpert Xpress SARS-CoV-2/FLU/RSV plus is a rapid, multiplexed real-time PCR test intended for the simultaneous qualitative detection and differentiation of SARS-CoV-2, Influenza A, Influenza B, and respiratory syncytial virus (RSV) viral RNA in either nasopharyngeal swab or nasal swab specimens.                Imaging Results    None          Medications - No data to display  Medical Decision Making  Awake alert and oriented no acute distress 68-year-old male presents emergency room with complaints of nasal congestion and postnasal drip.  Patient reports wife with positive COVID 1 week ago.  Head atraumatic. PERRLA, EOMI.  Bilateral breath sounds clear to auscultation.  Mucous membranes moist.  Posterior oropharynx pink.  Nares patent.  Mucosa pink.  Clear drainage.  Clear postnasal drip.  No lymphadenopathy.  All other review of systems within normal limits.  Skin warm dry and intact.  GCS 15 cranial nerves 2-12 intact  neuro focal intact.      Differential diagnoses:  URI, strep, covid, flu    Amount and/or Complexity of Data Reviewed  Labs: ordered.     Details: Covid +                                       Clinical Impression:  Final diagnoses:  [U07.1] COVID-19 (Primary)          ED Disposition Condition    Discharge Stable          ED Prescriptions       Medication Sig Dispense Start Date End Date Auth. Provider    nirmatrelvir-ritonavir (PAXLOVID) 300 mg (150 mg x 2)-100 mg copackaged tablets (EUA) Take 3 tablets by mouth 2 (two) times daily. Each dose contains 2 nirmatrelvir (pink tablets) and 1 ritonavir (white tablet). Take all 3 tablets together 5 tablet 1/5/2024 -- Arabella López NP          Follow-up Information       Follow up With Specialties Details Why Contact Info    Lisa Conroy, P Cardiology   [address] [facility] [address]  [phone]               Arabella López NP  01/05/24 0646

## 2024-04-06 ENCOUNTER — HOSPITAL ENCOUNTER (OUTPATIENT)
Dept: RADIOLOGY | Facility: HOSPITAL | Age: 69
Discharge: HOME | End: 2024-04-06
Attending: NURSE PRACTITIONER
Payer: MEDICARE

## 2024-04-06 DIAGNOSIS — G89.29 CHRONIC PAIN OF RIGHT KNEE: ICD-10-CM

## 2024-04-06 DIAGNOSIS — G89.29 CHRONIC PAIN OF RIGHT KNEE: Primary | ICD-10-CM

## 2024-04-06 DIAGNOSIS — M25.561 CHRONIC PAIN OF RIGHT KNEE: ICD-10-CM

## 2024-04-06 DIAGNOSIS — M25.561 CHRONIC PAIN OF RIGHT KNEE: Primary | ICD-10-CM

## 2024-04-06 PROCEDURE — 73560 X-RAY EXAM OF KNEE 1 OR 2: CPT | Mod: TC,RT

## 2024-07-17 ENCOUNTER — LAB VISIT (OUTPATIENT)
Dept: LAB | Facility: HOSPITAL | Age: 69
End: 2024-07-17
Attending: INTERNAL MEDICINE
Payer: MEDICARE

## 2024-07-17 DIAGNOSIS — E78.5 HYPERLIPIDEMIA, UNSPECIFIED HYPERLIPIDEMIA TYPE: Primary | ICD-10-CM

## 2024-07-17 DIAGNOSIS — I10 ESSENTIAL HYPERTENSION, MALIGNANT: ICD-10-CM

## 2024-07-17 LAB
ALBUMIN SERPL-MCNC: 4.2 G/DL (ref 3.4–4.8)
ALBUMIN/GLOB SERPL: 1.4 RATIO (ref 1.1–2)
ALP SERPL-CCNC: 55 UNIT/L (ref 40–150)
ALT SERPL-CCNC: 29 UNIT/L (ref 0–55)
ANION GAP SERPL CALC-SCNC: 9 MEQ/L
AST SERPL-CCNC: 20 UNIT/L (ref 5–34)
BILIRUB SERPL-MCNC: 0.8 MG/DL
BUN SERPL-MCNC: 30.4 MG/DL (ref 8.4–25.7)
CALCIUM SERPL-MCNC: 10.1 MG/DL (ref 8.8–10)
CHLORIDE SERPL-SCNC: 103 MMOL/L (ref 98–107)
CHOLEST SERPL-MCNC: 123 MG/DL
CHOLEST/HDLC SERPL: 4 {RATIO} (ref 0–5)
CO2 SERPL-SCNC: 27 MMOL/L (ref 23–31)
CREAT SERPL-MCNC: 1.47 MG/DL (ref 0.73–1.18)
CREAT/UREA NIT SERPL: 21
CRP SERPL HS-MCNC: 0.64 MG/L
GFR SERPLBLD CREATININE-BSD FMLA CKD-EPI: 52 ML/MIN/1.73/M2
GLOBULIN SER-MCNC: 3.1 GM/DL (ref 2.4–3.5)
GLUCOSE SERPL-MCNC: 119 MG/DL (ref 82–115)
HDLC SERPL-MCNC: 32 MG/DL (ref 35–60)
LDLC SERPL CALC-MCNC: 52 MG/DL (ref 50–140)
POTASSIUM SERPL-SCNC: 5.1 MMOL/L (ref 3.5–5.1)
PROT SERPL-MCNC: 7.3 GM/DL (ref 5.8–7.6)
SODIUM SERPL-SCNC: 139 MMOL/L (ref 136–145)
TRIGL SERPL-MCNC: 193 MG/DL (ref 34–140)
VLDLC SERPL CALC-MCNC: 39 MG/DL

## 2024-07-17 PROCEDURE — 80061 LIPID PANEL: CPT

## 2024-07-17 PROCEDURE — 80053 COMPREHEN METABOLIC PANEL: CPT

## 2024-07-17 PROCEDURE — 86141 C-REACTIVE PROTEIN HS: CPT

## 2024-07-17 PROCEDURE — 36415 COLL VENOUS BLD VENIPUNCTURE: CPT

## 2024-12-06 ENCOUNTER — HOSPITAL ENCOUNTER (EMERGENCY)
Facility: HOSPITAL | Age: 69
Discharge: HOME | End: 2024-12-06
Attending: STUDENT IN AN ORGANIZED HEALTH CARE EDUCATION/TRAINING PROGRAM
Payer: MEDICARE

## 2024-12-06 VITALS
TEMPERATURE: 98 F | HEART RATE: 88 BPM | BODY MASS INDEX: 26.66 KG/M2 | RESPIRATION RATE: 18 BRPM | SYSTOLIC BLOOD PRESSURE: 148 MMHG | DIASTOLIC BLOOD PRESSURE: 75 MMHG | WEIGHT: 180 LBS | HEIGHT: 69 IN | OXYGEN SATURATION: 98 %

## 2024-12-06 DIAGNOSIS — J06.9 VIRAL URI WITH COUGH: Primary | ICD-10-CM

## 2024-12-06 LAB
FLUAV AG UPPER RESP QL IA.RAPID: NOT DETECTED
FLUBV AG UPPER RESP QL IA.RAPID: NOT DETECTED
SARS-COV-2 RNA RESP QL NAA+PROBE: NOT DETECTED

## 2024-12-06 PROCEDURE — 96372 THER/PROPH/DIAG INJ SC/IM: CPT | Performed by: STUDENT IN AN ORGANIZED HEALTH CARE EDUCATION/TRAINING PROGRAM

## 2024-12-06 PROCEDURE — 99284 EMERGENCY DEPT VISIT MOD MDM: CPT | Mod: 25

## 2024-12-06 PROCEDURE — 63600175 PHARM REV CODE 636 W HCPCS: Performed by: STUDENT IN AN ORGANIZED HEALTH CARE EDUCATION/TRAINING PROGRAM

## 2024-12-06 PROCEDURE — 0240U COVID/FLU A&B PCR: CPT | Performed by: STUDENT IN AN ORGANIZED HEALTH CARE EDUCATION/TRAINING PROGRAM

## 2024-12-06 RX ORDER — ALBUTEROL SULFATE 90 UG/1
1-2 INHALANT RESPIRATORY (INHALATION) EVERY 6 HOURS PRN
Qty: 6.7 G | Refills: 0 | Status: SHIPPED | OUTPATIENT
Start: 2024-12-06

## 2024-12-06 RX ORDER — DEXAMETHASONE SODIUM PHOSPHATE 4 MG/ML
8 INJECTION, SOLUTION INTRA-ARTICULAR; INTRALESIONAL; INTRAMUSCULAR; INTRAVENOUS; SOFT TISSUE
Status: COMPLETED | OUTPATIENT
Start: 2024-12-06 | End: 2024-12-06

## 2024-12-06 RX ORDER — BROMPHENIRAMINE MALEATE, PSEUDOEPHEDRINE HYDROCHLORIDE, AND DEXTROMETHORPHAN HYDROBROMIDE 2; 30; 10 MG/5ML; MG/5ML; MG/5ML
10 SYRUP ORAL EVERY 4 HOURS PRN
Qty: 118 ML | Refills: 1 | Status: SHIPPED | OUTPATIENT
Start: 2024-12-06 | End: 2024-12-16

## 2024-12-06 RX ADMIN — DEXAMETHASONE SODIUM PHOSPHATE 8 MG: 4 INJECTION, SOLUTION INTRA-ARTICULAR; INTRALESIONAL; INTRAMUSCULAR; INTRAVENOUS; SOFT TISSUE at 09:12

## 2024-12-06 NOTE — ED PROVIDER NOTES
Encounter Date: 12/6/2024       History     Chief Complaint   Patient presents with    Cough     Pt states he feels like he coming down with a cold; over the past few days he had watery eyes and runny nose; last night he started coughing, dry; no fevers, no pain      HPI  Patient is a 69-year-old male past medical history CHF, CAD, diabetes, GERD, gout, hypertension, who presents to the ER complaining of cough, runny nose, ongoing for the past few days.  He denies any fevers, chills, nausea, vomiting.  Review of patient's allergies indicates:   Allergen Reactions    Ace inhibitors      Other reaction(s): Dry cough  Other reaction(s): Dry cough       Past Medical History:   Diagnosis Date    CHF (congestive heart failure)     Coronary artery disease     Diabetes mellitus     GERD (gastroesophageal reflux disease)     Gout, unspecified     Hypertension     Mixed hyperlipidemia      Past Surgical History:   Procedure Laterality Date    CORONARY ARTERY BYPASS GRAFT       No family history on file.  Social History     Tobacco Use    Smoking status: Never    Smokeless tobacco: Never   Substance Use Topics    Alcohol use: Yes     Comment: occas     Review of Systems   Constitutional:  Negative for fever.   HENT:  Positive for postnasal drip and rhinorrhea. Negative for sore throat.    Eyes:  Negative for visual disturbance.   Respiratory:  Positive for cough. Negative for shortness of breath.    Cardiovascular:  Negative for chest pain.   Gastrointestinal:  Negative for nausea.   Endocrine: Negative for polyuria.   Genitourinary:  Negative for dysuria.   Musculoskeletal:  Negative for back pain.   Skin:  Negative for rash.   Neurological:  Negative for weakness.   Hematological:  Does not bruise/bleed easily.   All other systems reviewed and are negative.      Physical Exam     Initial Vitals [12/06/24 0836]   BP Pulse Resp Temp SpO2   (!) 148/75 88 18 97.9 °F (36.6 °C) 98 %      MAP       --         Physical  Exam    Nursing note and vitals reviewed.  Constitutional: Vital signs are normal. He appears well-developed and well-nourished. He is not diaphoretic. He is active.  Non-toxic appearance. He does not appear ill. No distress.   HENT:   Head: Normocephalic and atraumatic.   Eyes: Conjunctivae are normal. Pupils are equal, round, and reactive to light. Right conjunctiva is not injected. Left conjunctiva is not injected.   Neck: Trachea normal. Neck supple.   Normal range of motion.   Full passive range of motion without pain.     Cardiovascular:  Normal rate, regular rhythm, S1 normal, S2 normal, intact distal pulses and normal pulses.           Pulmonary/Chest: Breath sounds normal. No respiratory distress. He has no wheezes.   Abdominal: Abdomen is soft. Bowel sounds are normal. There is no abdominal tenderness.   Musculoskeletal:         General: No edema. Normal range of motion.      Cervical back: Full passive range of motion without pain, normal range of motion and neck supple. No rigidity.      Right lower leg: No swelling. No edema.      Left lower leg: No swelling. No edema.     Neurological: He is alert and oriented to person, place, and time.   Skin: Skin is warm and dry. Capillary refill takes less than 2 seconds.         ED Course   Procedures  Labs Reviewed   COVID/FLU A&B PCR - Normal       Result Value    Influenza A PCR Not Detected      Influenza B PCR Not Detected      SARS-CoV-2 PCR Not Detected      Narrative:     The Xpert Xpress SARS-CoV-2/FLU/RSV plus is a rapid, multiplexed real-time PCR test intended for the simultaneous qualitative detection and differentiation of SARS-CoV-2, Influenza A, Influenza B, and respiratory syncytial virus (RSV) viral RNA in either nasopharyngeal swab or nasal swab specimens.                Imaging Results    None          Medications   dexAMETHasone injection 8 mg (8 mg Intramuscular Given 12/6/24 0900)     Medical Decision Making  Patient is a 69-year-old male  past medical history CHF, CAD, diabetes, GERD, gout, hypertension, who presents to the ER complaining of cough, runny nose, ongoing for the past few days.     Differential diagnosis includes but not limited to:  COVID, influenza, pneumonia, viral URI     Patient vitals stable on arrival.  Afebrile.  Clear breath sounds bilaterally.  Doubt pneumonia.  Viral swabs obtained.  COVID, influenza were all negative.  Will treat for viral URI, dose of dexamethasone given here in the ED. Will discharge on Bromfed, albuterol.  Encouraged to follow-up PCP in next 2-3 days.  Patient stable for discharge.  Strict return precautions given.    Goran Robison M.D.  Emergency Medicine        Risk  Prescription drug management.                                      Clinical Impression:  Final diagnoses:  [J06.9] Viral URI with cough (Primary)          ED Disposition Condition    Discharge           ED Prescriptions       Medication Sig Dispense Start Date End Date Auth. Provider    brompheniramine-pseudoeph-DM (BROMFED DM) 2-30-10 mg/5 mL Syrp Take 10 mLs by mouth every 4 (four) hours as needed (cough). 118 mL 12/6/2024 12/16/2024 Goran Robison MD    albuterol (PROVENTIL/VENTOLIN HFA) 90 mcg/actuation inhaler Inhale 1-2 puffs into the lungs every 6 (six) hours as needed for Wheezing. Rescue 6.7 g 12/6/2024 -- Goran Robison MD          Follow-up Information       Follow up With Specialties Details Why Contact Info    Lisa Conroy FNP Cardiology Schedule an appointment as soon as possible for a visit in 2 days For follow up [address] [facility] [address]  [phone]      [facility] - Emergency Dept Emergency Medicine  If symptoms worsen [address] [facility] [id number] [phone]             Goran Robison MD  12/06/24 1111

## 2025-04-30 ENCOUNTER — LAB VISIT (OUTPATIENT)
Dept: LAB | Facility: HOSPITAL | Age: 70
End: 2025-04-30
Attending: INTERNAL MEDICINE
Payer: MEDICARE

## 2025-04-30 DIAGNOSIS — I10 ESSENTIAL HYPERTENSION, MALIGNANT: ICD-10-CM

## 2025-04-30 DIAGNOSIS — E78.5 HYPERLIPIDEMIA, UNSPECIFIED HYPERLIPIDEMIA TYPE: Primary | ICD-10-CM

## 2025-04-30 LAB
ALBUMIN SERPL-MCNC: 4.1 G/DL (ref 3.4–4.8)
ALBUMIN/GLOB SERPL: 1.2 RATIO (ref 1.1–2)
ALP SERPL-CCNC: 61 UNIT/L (ref 40–150)
ALT SERPL-CCNC: 36 UNIT/L (ref 0–55)
ANION GAP SERPL CALC-SCNC: 8 MEQ/L
AST SERPL-CCNC: 24 UNIT/L (ref 11–45)
BILIRUB SERPL-MCNC: 0.9 MG/DL
BUN SERPL-MCNC: 25.3 MG/DL (ref 8.4–25.7)
CALCIUM SERPL-MCNC: 8.7 MG/DL (ref 8.8–10)
CHLORIDE SERPL-SCNC: 108 MMOL/L (ref 98–107)
CHOLEST SERPL-MCNC: 104 MG/DL
CHOLEST/HDLC SERPL: 3 {RATIO} (ref 0–5)
CO2 SERPL-SCNC: 28 MMOL/L (ref 23–31)
CREAT SERPL-MCNC: 1.3 MG/DL (ref 0.72–1.25)
CREAT/UREA NIT SERPL: 19
GFR SERPLBLD CREATININE-BSD FMLA CKD-EPI: 60 ML/MIN/1.73/M2
GLOBULIN SER-MCNC: 3.5 GM/DL (ref 2.4–3.5)
GLUCOSE SERPL-MCNC: 103 MG/DL (ref 82–115)
HDLC SERPL-MCNC: 34 MG/DL (ref 35–60)
LDLC SERPL CALC-MCNC: 12 MG/DL (ref 50–140)
POTASSIUM SERPL-SCNC: 3.9 MMOL/L (ref 3.5–5.1)
PROT SERPL-MCNC: 7.6 GM/DL (ref 5.8–7.6)
SODIUM SERPL-SCNC: 144 MMOL/L (ref 136–145)
TRIGL SERPL-MCNC: 289 MG/DL (ref 34–140)
VLDLC SERPL CALC-MCNC: 58 MG/DL

## 2025-04-30 PROCEDURE — 80061 LIPID PANEL: CPT

## 2025-04-30 PROCEDURE — 36415 COLL VENOUS BLD VENIPUNCTURE: CPT

## 2025-04-30 PROCEDURE — 80053 COMPREHEN METABOLIC PANEL: CPT

## 2025-06-20 ENCOUNTER — LAB VISIT (OUTPATIENT)
Dept: LAB | Facility: HOSPITAL | Age: 70
End: 2025-06-20
Attending: NURSE PRACTITIONER
Payer: MEDICARE

## 2025-06-20 DIAGNOSIS — Z00.00 ROUTINE GENERAL MEDICAL EXAMINATION AT A HEALTH CARE FACILITY: Primary | ICD-10-CM

## 2025-06-20 DIAGNOSIS — M1A.00X0 CHRONIC GOUTY ARTHROPATHY WITHOUT TOPHI: ICD-10-CM

## 2025-06-20 LAB
ALBUMIN SERPL-MCNC: 4 G/DL (ref 3.4–4.8)
ALBUMIN/CREAT UR: 37.3 MG/GM CR (ref 0–30)
ALBUMIN/GLOB SERPL: 1.1 RATIO (ref 1.1–2)
ALP SERPL-CCNC: 64 UNIT/L (ref 40–150)
ALT SERPL-CCNC: 24 UNIT/L (ref 0–55)
ANION GAP SERPL CALC-SCNC: 11 MEQ/L
AST SERPL-CCNC: 22 UNIT/L (ref 11–45)
BASOPHILS # BLD AUTO: 0.02 X10(3)/MCL
BASOPHILS NFR BLD AUTO: 0.4 %
BILIRUB SERPL-MCNC: 0.7 MG/DL
BUN SERPL-MCNC: 22.6 MG/DL (ref 8.4–25.7)
CALCIUM SERPL-MCNC: 9.1 MG/DL (ref 8.8–10)
CHLORIDE SERPL-SCNC: 107 MMOL/L (ref 98–107)
CHOLEST SERPL-MCNC: 153 MG/DL
CHOLEST/HDLC SERPL: 5 {RATIO} (ref 0–5)
CO2 SERPL-SCNC: 25 MMOL/L (ref 23–31)
CREAT SERPL-MCNC: 1 MG/DL (ref 0.72–1.25)
CREAT UR-MCNC: 144.4 MG/DL (ref 63–166)
CREAT/UREA NIT SERPL: 23
EOSINOPHIL # BLD AUTO: 0.43 X10(3)/MCL (ref 0–0.9)
EOSINOPHIL NFR BLD AUTO: 7.9 %
ERYTHROCYTE [DISTWIDTH] IN BLOOD BY AUTOMATED COUNT: 13.1 % (ref 11.5–17)
EST. AVERAGE GLUCOSE BLD GHB EST-MCNC: 114 MG/DL
GFR SERPLBLD CREATININE-BSD FMLA CKD-EPI: >60 ML/MIN/1.73/M2
GLOBULIN SER-MCNC: 3.6 GM/DL (ref 2.4–3.5)
GLUCOSE SERPL-MCNC: 144 MG/DL (ref 82–115)
HBA1C MFR BLD: 5.6 %
HCT VFR BLD AUTO: 37.9 % (ref 42–52)
HDLC SERPL-MCNC: 30 MG/DL (ref 35–60)
HGB BLD-MCNC: 13.2 G/DL (ref 14–18)
IMM GRANULOCYTES # BLD AUTO: 0 X10(3)/MCL (ref 0–0.04)
IMM GRANULOCYTES NFR BLD AUTO: 0 %
LDLC SERPL CALC-MCNC: 46 MG/DL (ref 50–140)
LYMPHOCYTES # BLD AUTO: 0.97 X10(3)/MCL (ref 0.6–4.6)
LYMPHOCYTES NFR BLD AUTO: 17.8 %
MCH RBC QN AUTO: 32.1 PG (ref 27–31)
MCHC RBC AUTO-ENTMCNC: 34.8 G/DL (ref 33–36)
MCV RBC AUTO: 92.2 FL (ref 80–94)
MICROALBUMIN UR-MCNC: 53.9 UG/ML
MONOCYTES # BLD AUTO: 0.49 X10(3)/MCL (ref 0.1–1.3)
MONOCYTES NFR BLD AUTO: 9 %
NEUTROPHILS # BLD AUTO: 3.53 X10(3)/MCL (ref 2.1–9.2)
NEUTROPHILS NFR BLD AUTO: 64.9 %
PLATELET # BLD AUTO: 238 X10(3)/MCL (ref 130–400)
PMV BLD AUTO: 9 FL (ref 7.4–10.4)
POTASSIUM SERPL-SCNC: 3.9 MMOL/L (ref 3.5–5.1)
PROT SERPL-MCNC: 7.6 GM/DL (ref 5.8–7.6)
PSA SERPL-MCNC: 0.57 NG/ML
RBC # BLD AUTO: 4.11 X10(6)/MCL (ref 4.7–6.1)
SODIUM SERPL-SCNC: 143 MMOL/L (ref 136–145)
T4 FREE SERPL-MCNC: 0.86 NG/DL (ref 0.7–1.48)
TRIGL SERPL-MCNC: 383 MG/DL (ref 34–140)
TSH SERPL-ACNC: 3.06 UIU/ML (ref 0.35–4.94)
URATE SERPL-MCNC: 9.5 MG/DL (ref 3.5–7.2)
VLDLC SERPL CALC-MCNC: 77 MG/DL
WBC # BLD AUTO: 5.44 X10(3)/MCL (ref 4.5–11.5)

## 2025-06-20 PROCEDURE — 82043 UR ALBUMIN QUANTITATIVE: CPT

## 2025-06-20 PROCEDURE — 80061 LIPID PANEL: CPT

## 2025-06-20 PROCEDURE — 36415 COLL VENOUS BLD VENIPUNCTURE: CPT

## 2025-06-20 PROCEDURE — 83036 HEMOGLOBIN GLYCOSYLATED A1C: CPT

## 2025-06-20 PROCEDURE — 84439 ASSAY OF FREE THYROXINE: CPT

## 2025-06-20 PROCEDURE — 84550 ASSAY OF BLOOD/URIC ACID: CPT

## 2025-06-20 PROCEDURE — 80053 COMPREHEN METABOLIC PANEL: CPT

## 2025-06-20 PROCEDURE — 84443 ASSAY THYROID STIM HORMONE: CPT

## 2025-06-20 PROCEDURE — 84153 ASSAY OF PSA TOTAL: CPT

## 2025-06-20 PROCEDURE — 85025 COMPLETE CBC W/AUTO DIFF WBC: CPT
